# Patient Record
Sex: MALE | Race: WHITE | URBAN - METROPOLITAN AREA
[De-identification: names, ages, dates, MRNs, and addresses within clinical notes are randomized per-mention and may not be internally consistent; named-entity substitution may affect disease eponyms.]

---

## 2018-08-02 ENCOUNTER — INPATIENT (INPATIENT)
Facility: HOSPITAL | Age: 73
LOS: 12 days | Discharge: HOME | DRG: 233 | End: 2018-08-15
Attending: THORACIC SURGERY (CARDIOTHORACIC VASCULAR SURGERY) | Admitting: INTERNAL MEDICINE
Payer: MEDICARE

## 2018-08-02 VITALS
HEART RATE: 64 BPM | TEMPERATURE: 98 F | WEIGHT: 164.91 LBS | DIASTOLIC BLOOD PRESSURE: 98 MMHG | OXYGEN SATURATION: 99 % | SYSTOLIC BLOOD PRESSURE: 174 MMHG | HEIGHT: 68 IN

## 2018-08-02 DIAGNOSIS — I10 ESSENTIAL (PRIMARY) HYPERTENSION: ICD-10-CM

## 2018-08-02 DIAGNOSIS — I25.10 ATHEROSCLEROTIC HEART DISEASE OF NATIVE CORONARY ARTERY WITHOUT ANGINA PECTORIS: ICD-10-CM

## 2018-08-02 DIAGNOSIS — R94.39 ABNORMAL RESULT OF OTHER CARDIOVASCULAR FUNCTION STUDY: ICD-10-CM

## 2018-08-02 LAB
ANION GAP SERPL CALC-SCNC: 15 MMOL/L — SIGNIFICANT CHANGE UP (ref 5–17)
APTT BLD: 33.1 SEC — SIGNIFICANT CHANGE UP (ref 27.5–37.4)
BUN SERPL-MCNC: 16 MG/DL — SIGNIFICANT CHANGE UP (ref 7–23)
CALCIUM SERPL-MCNC: 9.2 MG/DL — SIGNIFICANT CHANGE UP (ref 8.4–10.5)
CHLORIDE SERPL-SCNC: 103 MMOL/L — SIGNIFICANT CHANGE UP (ref 96–108)
CO2 SERPL-SCNC: 22 MMOL/L — SIGNIFICANT CHANGE UP (ref 22–31)
CREAT SERPL-MCNC: 1.08 MG/DL — SIGNIFICANT CHANGE UP (ref 0.5–1.3)
GLUCOSE SERPL-MCNC: 110 MG/DL — HIGH (ref 70–99)
HCT VFR BLD CALC: 44.7 % — SIGNIFICANT CHANGE UP (ref 39–50)
HGB BLD-MCNC: 15.2 G/DL — SIGNIFICANT CHANGE UP (ref 13–17)
INR BLD: 1.05 RATIO — SIGNIFICANT CHANGE UP (ref 0.88–1.16)
MCHC RBC-ENTMCNC: 31.8 PG — SIGNIFICANT CHANGE UP (ref 27–34)
MCHC RBC-ENTMCNC: 33.9 GM/DL — SIGNIFICANT CHANGE UP (ref 32–36)
MCV RBC AUTO: 93.8 FL — SIGNIFICANT CHANGE UP (ref 80–100)
PLATELET # BLD AUTO: 229 K/UL — SIGNIFICANT CHANGE UP (ref 150–400)
POTASSIUM SERPL-MCNC: 3.9 MMOL/L — SIGNIFICANT CHANGE UP (ref 3.5–5.3)
POTASSIUM SERPL-SCNC: 3.9 MMOL/L — SIGNIFICANT CHANGE UP (ref 3.5–5.3)
PROTHROM AB SERPL-ACNC: 11.3 SEC — SIGNIFICANT CHANGE UP (ref 9.8–12.7)
RBC # BLD: 4.76 M/UL — SIGNIFICANT CHANGE UP (ref 4.2–5.8)
RBC # FLD: 11.7 % — SIGNIFICANT CHANGE UP (ref 10.3–14.5)
SODIUM SERPL-SCNC: 140 MMOL/L — SIGNIFICANT CHANGE UP (ref 135–145)
WBC # BLD: 7.2 K/UL — SIGNIFICANT CHANGE UP (ref 3.8–10.5)
WBC # FLD AUTO: 7.2 K/UL — SIGNIFICANT CHANGE UP (ref 3.8–10.5)

## 2018-08-02 RX ORDER — SIMVASTATIN 20 MG/1
20 TABLET, FILM COATED ORAL AT BEDTIME
Qty: 0 | Refills: 0 | Status: DISCONTINUED | OUTPATIENT
Start: 2018-08-02 | End: 2018-08-03

## 2018-08-02 RX ORDER — ASPIRIN/CALCIUM CARB/MAGNESIUM 324 MG
81 TABLET ORAL DAILY
Qty: 0 | Refills: 0 | Status: DISCONTINUED | OUTPATIENT
Start: 2018-08-02 | End: 2018-08-07

## 2018-08-02 RX ORDER — AMLODIPINE BESYLATE 2.5 MG/1
2.5 TABLET ORAL DAILY
Qty: 0 | Refills: 0 | Status: DISCONTINUED | OUTPATIENT
Start: 2018-08-02 | End: 2018-08-07

## 2018-08-02 RX ORDER — SIMVASTATIN 20 MG/1
1 TABLET, FILM COATED ORAL
Qty: 0 | Refills: 0 | COMMUNITY

## 2018-08-02 RX ORDER — METOPROLOL TARTRATE 50 MG
25 TABLET ORAL
Qty: 0 | Refills: 0 | Status: DISCONTINUED | OUTPATIENT
Start: 2018-08-02 | End: 2018-08-07

## 2018-08-02 RX ADMIN — SIMVASTATIN 20 MILLIGRAM(S): 20 TABLET, FILM COATED ORAL at 21:37

## 2018-08-02 NOTE — H&P CARDIOLOGY - HISTORY OF PRESENT ILLNESS
This is a 72 yo man with history of HTN with complaints of intermittent chest heaviness, increasing fatigue and shortness of breath. Went for stress test today which was abnormal pt had chest heaviness on treadmill with diffuse ST depression. Pt sent for urgent cardiac cath today with Dr. Haney. Pt was loaded with Brilinta and ASA in office today.     Dr. Carrillo PMD - [facility]   Dr. Haney - Cardiologist

## 2018-08-02 NOTE — CHART NOTE - NSCHARTNOTEFT_GEN_A_CORE
s/p cardiac cath via right radial access   2 vessel disease preliminary report prox LAD 95% and distal RCA 99%  CT consult with Dr. Tamayo service   low dose beta blocker initiated and ARB discontinued     discussed with Dr. Haney s/p cardiac cath via right radial access   2 vessel disease preliminary report prox LAD 95% and distal RCA 99%  CT consult with Dr. Tamayo service   low dose beta blocker initiated and ARB discontinued   Obtain baseline PT/PTT   start Heparin gtt 6 hours post cath no bolus     discussed with Dr. Haney and CT NP

## 2018-08-02 NOTE — H&P CARDIOLOGY - ATTENDING COMMENTS
Patient was sent for urgent cath for progressive angina and markedly abnormal stress echo with st depression and inferiro hypokinesia  cath revealed normal LV function with heavily calcified coronaries with severe diseae of proxix aand mid LAD with anurysmal dilitation of proximal LAD and severe disease of distal RCA 99% probable culprit    CABG recommended  patient stable no cp or sob stable VS negative physical exam  Recommend  CT surgery consult appreciated  IV heparin statin beta blocker

## 2018-08-02 NOTE — CONSULT NOTE ADULT - SUBJECTIVE AND OBJECTIVE BOX
History of Present Illness:    73y Male with history of HTN and former smoker with complaints of intermittent chest heaviness, increasing fatigue and shortness of breath who presented today to [facility] for an urgent cardiac cath following an abnormal stress test today, during which patient became symptomatic with c/o chest heaviness on treadmill with diffuse ST depression. Patient was loaded with Brilinta and ASA in the office and sent emergently for a cardiac cath.  Preliminary Cath finding revealed: multivessel CAD, Proximal LAD 95% and distal RCA 99%.  CTS consult called to evaluate patient for cardiac surgery.   Past Medical History  Hypertension, unspecified type    Past Surgical History  No significant past surgical history    MEDICATIONS  (STANDING):  amLODIPine Tablet 2.5 milliGRAM(s) Oral daily  simvastatin 20 milliGRAM(s) Oral at bedtime    MEDICATIONS  (PRN):    Antiplatelet therapy: Brilinta load                      Last dose/amt: 8/2 / 180 mg PO x 1     Allergies: No Known Allergies    SOCIAL HISTORY:  Former Smoker: [X ] Yes  [ ] No        PACK YEARS: 10 Pk / years                        WHEN QUIT?  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   Live with: Spouse  Assist device use: Denies   Relevant Family History  FAMILY HISTORY:  Family history of cerebrovascular accident (CVA)      Review of Systems  GENERAL:  Fevers[] chills[] sweats[] fatigue[] weight loss[] weight gain []                                        NEURO:  parathesias[] seizures []  syncope []  confusion []                                                                                  EYES: glasses[]  blurry vision[]  discharge[] pain[] glaucoma []                                                                            ENMT:  difficulty hearing []  vertigo[]  dysphagia[] epistaxis[] recent dental work []                                      CV:  chest pain[] palpitations[] MEDEIROS [] diaphoresis [] edema[]                                                                                             RESPIRATORY:  wheezing[] SOB[] cough [] sputum[] hemoptysis[]                                                                    GI:  nausea[]  vomiting []  diarrhea[] constipation [] melena []                                                                        : hematuria[ ]  dysuria[ ] urgency[] incontinence[]                                                                                              MUSCULOSKELETAL  arthritis[ ]  joint swelling [ ] muscle weakness [ ]                                                                  SKIN/BREAST:  rash[ ] itching [ ]  hair loss[ ] masses[ ]                                                                                                PSYCH:  dementia [ ] depression [ ] anxiety[ ]                                                                                                                  HEME/LYMPH:  bruises easily[ ] enlarged lymph nodes[ ] tender lymph nodes[ ]                                                 ENDOCRINE:  cold intolerance[ ] heat intolerance[ ] polydipsia[ ]                                                                              PHYSICAL EXAM  Vital Signs Last 24 Hrs  T(C): 36.8 (02 Aug 2018 17:45), Max: 36.8 (02 Aug 2018 17:45)  T(F): 98.3 (02 Aug 2018 17:45), Max: 98.3 (02 Aug 2018 17:45)  HR: 64 (02 Aug 2018 17:45) (64 - 64)  BP: 174/98 (02 Aug 2018 17:45) (174/98 - 174/98)  BP(mean): 123 (02 Aug 2018 17:45) (123 - 123)  RR: --  SpO2: 99% (02 Aug 2018 17:45) (99% - 99%)    General: Well nourished, well developed, no acute distress.                                                         Neuro: Normal exam oriented to person/place & time with no focal motor or sensory  deficits.                    Eyes: Normal exam of conjunctiva & lids, pupils equally reactive.   ENT: Normal exam of nasal/oral mucosa with absence of cyanosis.   Neck: Normal exam of jugular veins, trachea & thyroid.   Chest: Normal lung exam with good air movement absence of wheezes, rales, or rhonchi:                                                                          CV:  Auscultation: normal [ ] S3[ ] S4[ ] Irregular [ ] Rub[ ] Clicks[ ]  Murmurs none:[ ]systolic [ ]  diastolic [ ] holosystolic [ ]  Carotids: No Bruits[ ] Other____________ Abdominal Aorta: normal [ ] nonpalpable[ ]                                                                         GI: Normal exam of abdomen, liver & spleen with no noted masses or tenderness.  (+) BS X 4 Quadrants, Nontender / Non Distended.                                                                                             Extremities: Normal no evidence of cyanosis or deformity Edema: none[ ]trace[ ]1+[ ]2+[ ]3+[ ]4+[ ]  Lower Extremity Pulses: Right[ ] Left[ ]Varicosities[ ]  SKIN : Normal exam to inspection & palpation.                                                           LABS:                        15.2   7.2   )-----------( 229      ( 02 Aug 2018 18:01 )             44.7     08-02    140  |  103  |  16  ----------------------------<  110<H>  3.9   |  22  |  1.08    Ca    9.2      02 Aug 2018 18:01    Cardiac Cath:    TTE / FELIX: History of Present Illness:    73y Male with history of HTN and former smoker with complaints of intermittent chest heaviness, increasing fatigue and shortness of breath who presented today to [facility] for an urgent cardiac cath following an abnormal stress test today, during which patient became symptomatic with c/o chest heaviness on treadmill with diffuse ST depression. Patient was loaded with Brilinta and ASA in the office and sent emergently for a cardiac cath.  Preliminary Cath finding revealed: multivessel CAD, Proximal LAD 95% and distal RCA 99%.  CTS consult called to evaluate patient for cardiac surgery.   Past Medical History  Hypertension, unspecified type    Past Surgical History  No significant past surgical history    MEDICATIONS  (STANDING):  amLODIPine Tablet 2.5 milliGRAM(s) Oral daily  simvastatin 20 milliGRAM(s) Oral at bedtime    MEDICATIONS  (PRN):    Antiplatelet therapy: Brilinta load                      Last dose/amt: 8/2 / 180 mg PO x 1     Allergies: No Known Allergies    SOCIAL HISTORY:  Former Smoker: [X ] Yes  [ ] No        PACK YEARS: 10 Pk / years                        WHEN QUIT? 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   Live with: Spouse  Assist device use: Denies   Relevant Family History  FAMILY HISTORY:  Family history of cerebrovascular accident (CVA)      Review of Systems  GENERAL:  Fevers[] chills[] sweats[] fatigue[] weight loss[] weight gain []                                        NEURO:  parathesias[] seizures []  syncope []  confusion []                                                                                  EYES: glasses[]  blurry vision[]  discharge[] pain[] glaucoma []                                                                            ENMT:  difficulty hearing []  vertigo[]  dysphagia[] epistaxis[] recent dental work []                                      CV:  chest pain[X] palpitations[] MEDEIROS [] diaphoresis [] edema[]                                                                                             RESPIRATORY:  wheezing[] SOB[X] cough [] sputum[] hemoptysis[]                                                                    GI:  nausea[]  vomiting []  diarrhea[] constipation [] melena []                                                                        : hematuria[ ]  dysuria[ ] urgency[] incontinence[]                                                                                              MUSCULOSKELETAL  arthritis[ ]  joint swelling [ ] muscle weakness [ ]                                                                  SKIN/BREAST:  rash[ ] itching [ ]  hair loss[ ] masses[ ]                                                                                                PSYCH:  dementia [ ] depression [ ] anxiety[ ]                                                                                                                  HEME/LYMPH:  bruises easily[ ] enlarged lymph nodes[ ] tender lymph nodes[ ]                                                 ENDOCRINE:  cold intolerance[ ] heat intolerance[ ] polydipsia[ ]                                                                              PHYSICAL EXAM  Vital Signs Last 24 Hrs  T(C): 36.8 (02 Aug 2018 17:45), Max: 36.8 (02 Aug 2018 17:45)  T(F): 98.3 (02 Aug 2018 17:45), Max: 98.3 (02 Aug 2018 17:45)  HR: 64 (02 Aug 2018 17:45) (64 - 64)  BP: 174/98 (02 Aug 2018 17:45) (174/98 - 174/98)  BP(mean): 123 (02 Aug 2018 17:45) (123 - 123)  RR: --  SpO2: 99% (02 Aug 2018 17:45) (99% - 99%)    General: Well nourished, well developed, no acute distress.                                                         Neuro: Normal exam oriented to person/place & time with no focal motor or sensory  deficits.                    Eyes: Normal exam of conjunctiva & lids, pupils equally reactive.   ENT: Normal exam of nasal/oral mucosa with absence of cyanosis.   Neck: Normal exam of jugular veins, trachea & thyroid.   Chest: Normal lung exam with good air movement absence of wheezes, rales, or rhonchi:                                                                          CV:  Auscultation: normal [X ] S3[ ] S4[ ] Irregular [ ] Rub[ ] Clicks[ ]  Murmurs none:[X ]systolic [ ]  diastolic [ ] holosystolic [ ]  Carotids: No Bruits[-] Other____________ Abdominal Aorta: normal [X ] nonpalpable[X ]                                                                         GI: Normal exam of abdomen, liver & spleen with no noted masses or tenderness.  (+) BS X 4 Quadrants, Nontender / Non Distended.                                                                                             Extremities: Normal no evidence of cyanosis or deformity Edema: none[ ]trace[ ]1+[ ]2+[ ]3+[ ]4+[ ]  Lower Extremity Pulses: Right[+2 ] Left[+2 ]Varicosities[0 ]  SKIN : Normal exam to inspection & palpation.                                                           LABS:                        15.2   7.2   )-----------( 229      ( 02 Aug 2018 18:01 )             44.7     08-02    140  |  103  |  16  ----------------------------<  110<H>  3.9   |  22  |  1.08    Ca    9.2      02 Aug 2018 18:01    Cardiac Cath: EF 60%; < from: Cardiac Cath Lab - Adult (08.02.18 @ 18:05) The coronary circulation is right dominant. LM: Normal. heavily calcified Proximal LAD: There was a 95 % stenosis. The lesion was ulcerated, eccentric, and moderately calcified. followed by aneurysmal dilitation. Mid LAD: There was a tubular 80 % stenosis. post large diagonal D1: Angiography showed minor luminal irregularities with no flow limiting lesions.  Circumflex: Angiography showed minor luminal irregularities with no flow limiting lesions.  RCA: Angiography showed minor luminal irregularities with no flow limiting lesions. Distal RCA: There was a 99 % stenosis. The lesion was eccentric. large distal vessel

## 2018-08-02 NOTE — CONSULT NOTE ADULT - PROBLEM SELECTOR RECOMMENDATION 9
Admit to 2 [facility] Telemetry floor   Continue with ASA 81 mg PO daily   Start lopressor 25 mg PO BID   Continue with statin   Pre op Cardiac surgery work up in progress   TTE   Carotid Duplex study   Continue with Norvasc 5 mg PO daily for BP management   Start Heparin gtt for ACS 6 hours post sheath removal   PFT's  Type and Screen   MRSA /MSSA nasal swab   Start PUD prophylaxis   Activity as tolerated   Check P2Y12 in AM   Plan for cardiac surgery with Dr. Tamayo next week Monday   Plan of care discussed with attending

## 2018-08-03 DIAGNOSIS — Z01.818 ENCOUNTER FOR OTHER PREPROCEDURAL EXAMINATION: ICD-10-CM

## 2018-08-03 DIAGNOSIS — Z79.01 LONG TERM (CURRENT) USE OF ANTICOAGULANTS: ICD-10-CM

## 2018-08-03 DIAGNOSIS — Z29.9 ENCOUNTER FOR PROPHYLACTIC MEASURES, UNSPECIFIED: ICD-10-CM

## 2018-08-03 DIAGNOSIS — I25.10 ATHEROSCLEROTIC HEART DISEASE OF NATIVE CORONARY ARTERY WITHOUT ANGINA PECTORIS: ICD-10-CM

## 2018-08-03 PROBLEM — Z00.00 ENCOUNTER FOR PREVENTIVE HEALTH EXAMINATION: Status: ACTIVE | Noted: 2018-08-03

## 2018-08-03 LAB
ANION GAP SERPL CALC-SCNC: 10 MMOL/L — SIGNIFICANT CHANGE UP (ref 5–17)
APTT BLD: 52.3 SEC — HIGH (ref 27.5–37.4)
APTT BLD: 55.5 SEC — HIGH (ref 27.5–37.4)
APTT BLD: 72.6 SEC — HIGH (ref 27.5–37.4)
APTT BLD: 73.9 SEC — HIGH (ref 27.5–37.4)
BUN SERPL-MCNC: 15 MG/DL — SIGNIFICANT CHANGE UP (ref 7–23)
CALCIUM SERPL-MCNC: 8.8 MG/DL — SIGNIFICANT CHANGE UP (ref 8.4–10.5)
CHLORIDE SERPL-SCNC: 102 MMOL/L — SIGNIFICANT CHANGE UP (ref 96–108)
CO2 SERPL-SCNC: 26 MMOL/L — SIGNIFICANT CHANGE UP (ref 22–31)
CREAT SERPL-MCNC: 1.08 MG/DL — SIGNIFICANT CHANGE UP (ref 0.5–1.3)
GLUCOSE SERPL-MCNC: 114 MG/DL — HIGH (ref 70–99)
HCT VFR BLD CALC: 41.9 % — SIGNIFICANT CHANGE UP (ref 39–50)
HCT VFR BLD CALC: 43 % — SIGNIFICANT CHANGE UP (ref 39–50)
HGB BLD-MCNC: 14.4 G/DL — SIGNIFICANT CHANGE UP (ref 13–17)
HGB BLD-MCNC: 14.5 G/DL — SIGNIFICANT CHANGE UP (ref 13–17)
INR BLD: 1.01 RATIO — SIGNIFICANT CHANGE UP (ref 0.88–1.16)
MCHC RBC-ENTMCNC: 31.9 PG — SIGNIFICANT CHANGE UP (ref 27–34)
MCHC RBC-ENTMCNC: 33 PG — SIGNIFICANT CHANGE UP (ref 27–34)
MCHC RBC-ENTMCNC: 33.6 GM/DL — SIGNIFICANT CHANGE UP (ref 32–36)
MCHC RBC-ENTMCNC: 34.7 GM/DL — SIGNIFICANT CHANGE UP (ref 32–36)
MCV RBC AUTO: 95.2 FL — SIGNIFICANT CHANGE UP (ref 80–100)
MCV RBC AUTO: 95.3 FL — SIGNIFICANT CHANGE UP (ref 80–100)
MRSA PCR RESULT.: SIGNIFICANT CHANGE UP
PLATELET # BLD AUTO: 215 K/UL — SIGNIFICANT CHANGE UP (ref 150–400)
PLATELET # BLD AUTO: 219 K/UL — SIGNIFICANT CHANGE UP (ref 150–400)
POTASSIUM SERPL-MCNC: 4.2 MMOL/L — SIGNIFICANT CHANGE UP (ref 3.5–5.3)
POTASSIUM SERPL-SCNC: 4.2 MMOL/L — SIGNIFICANT CHANGE UP (ref 3.5–5.3)
PROTHROM AB SERPL-ACNC: 11 SEC — SIGNIFICANT CHANGE UP (ref 9.8–12.7)
RBC # BLD: 4.4 M/UL — SIGNIFICANT CHANGE UP (ref 4.2–5.8)
RBC # BLD: 4.52 M/UL — SIGNIFICANT CHANGE UP (ref 4.2–5.8)
RBC # FLD: 11.4 % — SIGNIFICANT CHANGE UP (ref 10.3–14.5)
RBC # FLD: 11.4 % — SIGNIFICANT CHANGE UP (ref 10.3–14.5)
S AUREUS DNA NOSE QL NAA+PROBE: SIGNIFICANT CHANGE UP
SODIUM SERPL-SCNC: 138 MMOL/L — SIGNIFICANT CHANGE UP (ref 135–145)
WBC # BLD: 6.8 K/UL — SIGNIFICANT CHANGE UP (ref 3.8–10.5)
WBC # BLD: 7.1 K/UL — SIGNIFICANT CHANGE UP (ref 3.8–10.5)
WBC # FLD AUTO: 6.8 K/UL — SIGNIFICANT CHANGE UP (ref 3.8–10.5)
WBC # FLD AUTO: 7.1 K/UL — SIGNIFICANT CHANGE UP (ref 3.8–10.5)

## 2018-08-03 PROCEDURE — 93880 EXTRACRANIAL BILAT STUDY: CPT | Mod: 26

## 2018-08-03 PROCEDURE — 99231 SBSQ HOSP IP/OBS SF/LOW 25: CPT

## 2018-08-03 PROCEDURE — 94010 BREATHING CAPACITY TEST: CPT | Mod: 26

## 2018-08-03 PROCEDURE — 93010 ELECTROCARDIOGRAM REPORT: CPT

## 2018-08-03 PROCEDURE — 93306 TTE W/DOPPLER COMPLETE: CPT | Mod: 26

## 2018-08-03 RX ORDER — HEPARIN SODIUM 5000 [USP'U]/ML
4400 INJECTION INTRAVENOUS; SUBCUTANEOUS EVERY 6 HOURS
Qty: 0 | Refills: 0 | Status: DISCONTINUED | OUTPATIENT
Start: 2018-08-03 | End: 2018-08-07

## 2018-08-03 RX ORDER — HEPARIN SODIUM 5000 [USP'U]/ML
INJECTION INTRAVENOUS; SUBCUTANEOUS
Qty: 25000 | Refills: 0 | Status: DISCONTINUED | OUTPATIENT
Start: 2018-08-03 | End: 2018-08-03

## 2018-08-03 RX ORDER — ATORVASTATIN CALCIUM 80 MG/1
40 TABLET, FILM COATED ORAL AT BEDTIME
Qty: 0 | Refills: 0 | Status: DISCONTINUED | OUTPATIENT
Start: 2018-08-03 | End: 2018-08-07

## 2018-08-03 RX ORDER — HEPARIN SODIUM 5000 [USP'U]/ML
1050 INJECTION INTRAVENOUS; SUBCUTANEOUS
Qty: 25000 | Refills: 0 | Status: DISCONTINUED | OUTPATIENT
Start: 2018-08-03 | End: 2018-08-07

## 2018-08-03 RX ORDER — PANTOPRAZOLE SODIUM 20 MG/1
40 TABLET, DELAYED RELEASE ORAL
Qty: 0 | Refills: 0 | Status: DISCONTINUED | OUTPATIENT
Start: 2018-08-03 | End: 2018-08-07

## 2018-08-03 RX ORDER — HEPARIN SODIUM 5000 [USP'U]/ML
4400 INJECTION INTRAVENOUS; SUBCUTANEOUS EVERY 6 HOURS
Qty: 0 | Refills: 0 | Status: DISCONTINUED | OUTPATIENT
Start: 2018-08-03 | End: 2018-08-03

## 2018-08-03 RX ORDER — SORBITOL SOLUTION 70 %
30 SOLUTION, ORAL MISCELLANEOUS ONCE
Qty: 0 | Refills: 0 | Status: COMPLETED | OUTPATIENT
Start: 2018-08-03 | End: 2018-08-03

## 2018-08-03 RX ADMIN — AMLODIPINE BESYLATE 2.5 MILLIGRAM(S): 2.5 TABLET ORAL at 05:33

## 2018-08-03 RX ADMIN — Medication 25 MILLIGRAM(S): at 05:33

## 2018-08-03 RX ADMIN — Medication 30 MILLILITER(S): at 12:08

## 2018-08-03 RX ADMIN — HEPARIN SODIUM 900 UNIT(S)/HR: 5000 INJECTION INTRAVENOUS; SUBCUTANEOUS at 01:56

## 2018-08-03 RX ADMIN — Medication 81 MILLIGRAM(S): at 05:33

## 2018-08-03 RX ADMIN — Medication 25 MILLIGRAM(S): at 18:09

## 2018-08-03 RX ADMIN — HEPARIN SODIUM 1050 UNIT(S)/HR: 5000 INJECTION INTRAVENOUS; SUBCUTANEOUS at 16:42

## 2018-08-03 RX ADMIN — HEPARIN SODIUM 1050 UNIT(S)/HR: 5000 INJECTION INTRAVENOUS; SUBCUTANEOUS at 23:56

## 2018-08-03 RX ADMIN — HEPARIN SODIUM 1050 UNIT(S)/HR: 5000 INJECTION INTRAVENOUS; SUBCUTANEOUS at 09:05

## 2018-08-03 RX ADMIN — ATORVASTATIN CALCIUM 40 MILLIGRAM(S): 80 TABLET, FILM COATED ORAL at 22:34

## 2018-08-03 RX ADMIN — PANTOPRAZOLE SODIUM 40 MILLIGRAM(S): 20 TABLET, DELAYED RELEASE ORAL at 05:33

## 2018-08-03 NOTE — PROGRESS NOTE ADULT - SUBJECTIVE AND OBJECTIVE BOX
Subjective   Hello 'I have no pain"  VITAL SIGNS    Telemetry:  NSR     Vital Signs Last 24 Hrs  T(C): 36.7 (18 @ 05:23), Max: 36.8 (18 @ 17:45)  T(F): 98.1 (18 @ 05:23), Max: 98.3 (18 @ 17:45)  HR: 75 (18 @ 05:23) (53 - 75)  BP: 137/75 (18 @ 05:23) (137/75 - 174/98)  RR: 18 (18 @ 05:23) (17 - 18)  SpO2: 97% (18 @ 05:23) (97% - 99%)             Daily Height in cm: 172.72 (02 Aug 2018 17:45)    Daily Weight in k.6 (03 Aug 2018 08:52)  MEDICATIONS  (STANDING):  amLODIPine   Tablet 2.5 milliGRAM(s) Oral daily  aspirin enteric coated 81 milliGRAM(s) Oral daily  atorvastatin 40 milliGRAM(s) Oral at bedtime  heparin  Infusion.  Unit(s)/Hr (9 mL/Hr) IV Continuous <Continuous>  metoprolol tartrate 25 milliGRAM(s) Oral two times a day  pantoprazole    Tablet 40 milliGRAM(s) Oral before breakfast    MEDICATIONS  (PRN):  heparin  Injectable 4400 Unit(s) IV Push every 6 hours PRN For aPTT less than 40    PHYSICAL EXAM  Neurology: alert and oriented x 3, nonfocal, no gross deficits  CV : S1 S2 RRR  Lungs: CTA  Abdomen: soft, nontender, nondistended, positive bowel sounds,   :      Vopids       Extremities:   b/l le warm well perfused + DP

## 2018-08-03 NOTE — PROGRESS NOTE ADULT - SUBJECTIVE AND OBJECTIVE BOX
Subjective: Patient seen and examined. No new events except as noted.   ttoday status post catheterization for unstable angina and markedly abnormal stress test  Severe distal right coronary disease and severe complex proximal and mid LAD disease calcified with aneurysm  Patient scheduled for coronary bypass surgery with Dr. Silvio Tamayo  Presently on IV heparin and by mouth beta blocker no chest pain chest pressure shortness of breath feels well  no recurrence of shortness of breath or chest pain  MEDICATIONS:  MEDICATIONS  (STANDING):  amLODIPine   Tablet 2.5 milliGRAM(s) Oral daily  aspirin enteric coated 81 milliGRAM(s) Oral daily  atorvastatin 40 milliGRAM(s) Oral at bedtime  heparin  Infusion.  Unit(s)/Hr (9 mL/Hr) IV Continuous <Continuous>  metoprolol tartrate 25 milliGRAM(s) Oral two times a day  pantoprazole    Tablet 40 milliGRAM(s) Oral before breakfast      PHYSICAL EXAM:  T(C): 36.4 (08-03-18 @ 13:48), Max: 36.8 (08-02-18 @ 17:45)  HR: 69 (08-03-18 @ 13:48) (53 - 75)  BP: 138/77 (08-03-18 @ 13:48) (137/75 - 174/98)  RR: 18 (08-03-18 @ 13:48) (17 - 18)  SpO2: 96% (08-03-18 @ 13:48) (96% - 99%)  Wt(kg): --  I&O's Summary    03 Aug 2018 07:01  -  03 Aug 2018 14:14  --------------------------------------------------------  IN: 360 mL / OUT: 0 mL / NET: 360 mL      Height (cm): 172.72 (08-02 @ 17:45)  Weight (kg): 74.8 (08-02 @ 17:45)  BMI (kg/m2): 25.1 (08-02 @ 17:45)  BSA (m2): 1.88 (08-02 @ 17:45)    Appearance: Normalno acute distress	  HEENT:   Normal oral mucosa, PERRL, EOMI	  Cardiovascular: Normal S1 S2, No JVD, No murmurs ,  Respiratory: Lungs clear to auscultation, normal effort 	  Gastrointestinal:  Soft, Non-tender, + BS	  Skin: No rashes, No ecchymoses, No cyanosis, warm to touch  Musculoskeletal: Normal range of motion, normal strength  Psychiatry:  Mood & affect appropriate  Ext: No edema  Peripheral pulses palpable 2+ bilaterally      LABS:    CARDIAC MARKERS:                                14.4   7.1   )-----------( 219      ( 03 Aug 2018 08:29 )             43.0     08-03    138  |  102  |  15  ----------------------------<  114<H>  4.2   |  26  |  1.08    Ca    8.8      03 Aug 2018 08:29      proBNP:   Lipid Profile:   HgA1c:   TSH:           TELEMETRY: 	    ECG:  	  RADIOLOGY:   DIAGNOSTIC TESTING:  [ ] Echocardiogram:  [ ]  Catheterization:  [ ] Stress Test:    OTHER:

## 2018-08-03 NOTE — PROGRESS NOTE ADULT - SUBJECTIVE AND OBJECTIVE BOX
Subjective:   Events overnight: R radial site stable, heparin gtt initated  CC: "I am feeling ok    VITAL SIGNS  T(F): 97.8  HR: 60  BP: 159/86  RR: 17  SpO2: 99%    Weight (kg): 74.8 (08-02-18 @ 17:45)  LAB                        15.2   7.2   )-----------( 229      ( 02 Aug 2018 18:01 )             44.7   140  |  103  |  16  ----------------------------<  110<H>  3.9   |  22  |  1.08  PT/INR - ( 02 Aug 2018 22:24 )   PT: 11.3 sec;   INR: 1.05 ratio    PTT - ( 02 Aug 2018 22:24 )  PTT:33.1 sec      DIAGNOSTICS: Cardiac Cath Lab - Adult (08.02.18 @ 18:05)   EF estimated was 60 %.  VALVES: MITRAL VALVE: The mitral valve exhibited no regurgitation.  CORONARY VESSELS: The coronary circulation is right dominant.  LM:   --  LM: Normal. heavily calcified  LAD:   --  Proximal LAD: There was a 95 % stenosis. The lesionwas  ulcerated, eccentric, and moderately calcified. followed by anurysmal  dilitation  --  Mid LAD: There was a tubular 80 % stenosis. post large diagonal  --  D1: Angiography showed minor luminal irregularities with no flow  limiting lesions.  CX: --  Circumflex: Angiography showed minor luminal irregularities with  no flow limiting lesions.  RCA:   --  RCA: Angiography showed minor luminal irregularities with no  flow limiting lesions.  --  Distal RCA: There was a 99 % stenosis. The lesion was eccentric. large  distal vessel  COMPLICATIONS: There were no complications.  DIAGNOSTIC IMPRESSIONS: Patient has severe distal RCA disease with some  left to right collaterals and severe proximal and mid LAD disease after  large diagonal with anurysmla dilitation or proximal LAD  DIAGNOSTIC RECOMMENDATIONS: Revasularization probably best served by CABG.    MEDICATIONS  amLODIPine   Tablet 2.5 milliGRAM(s) Oral daily  aspirin enteric coated 81 milliGRAM(s) Oral daily  heparin  Infusion.  Unit(s)/Hr IV Continuous <Continuous>  metoprolol tartrate 25 milliGRAM(s) Oral two times a day  pantoprazole    Tablet 40 milliGRAM(s) Oral before breakfast  simvastatin 20 milliGRAM(s) Oral at bedtime      PHYSICAL EXAM  GEN: No apparent distress, examined in bed, awoke from sleep  PSYCH: Appropriate, communicative, A+Ox3  NEURO: Non-focal,   CV: S1 S2 RRR without rub or murmur  PULMONARY:  clear  ABDOMEN:  Soft, non-tender, non-distended, bowel sounds active x 4  LBM:8/1  : voiding   VASCULAR: +pp +radial,  SKIN: Warm, dry, intact   R radial dressing clean,dry, intact  MUSCULOSKELETAL:  Moves all extremities equally

## 2018-08-03 NOTE — PROGRESS NOTE ADULT - ASSESSMENT
This is 73M with pmh of HTN with complaints of intermittent chest heaviness, increasing fatigue and shortness of breath. Presented to cardiologists office and underwent stress test which was found to be abnormal (+ chest heaviness with diffuse ST depression) and sent for urgent cardiac cath.  Pt was loaded with Brilinta and ASA in office today.    Cath revealed multivessel CAD and was admitted for further management and CABG.    Placed on tele  cardiac surgery consult called schedule dfor CABG Monday 8/6/18   Radial band discontinued at 1930   8/3@0200: Heparin gtt initiated patient seen and examine no chest pain  c/w cardiac surgery evaluation

## 2018-08-03 NOTE — PROGRESS NOTE ADULT - ASSESSMENT
1. Unstable angina no recurrence of pain  2. Normal LV function  3. Severe double vessel disease as describedwith heavy calcification    recommend  Received with coronary bypass surgery  Continue present regimen of medications including IV heparin

## 2018-08-03 NOTE — PROGRESS NOTE ADULT - PROBLEM SELECTOR PLAN 4
Pre op Cardiac surgery work up in progress   TTE   Carotid Duplex study   PFT's  Type and Screen   MRSA /MSSA nasal swab   Check P2Y12 in AM

## 2018-08-03 NOTE — PROGRESS NOTE ADULT - PROBLEM SELECTOR PLAN 1
Admit to 2DSU/2C telemetry  Plan for cardiac surgery with Dr. Tamayo next week Monday  Continue with ASA 81 mg PO daily   Start lopressor 25 mg PO BID   Continue with statin  Activity as tolerated

## 2018-08-04 LAB
ALBUMIN SERPL ELPH-MCNC: 4 G/DL — SIGNIFICANT CHANGE UP (ref 3.3–5)
ALP SERPL-CCNC: 65 U/L — SIGNIFICANT CHANGE UP (ref 40–120)
ALT FLD-CCNC: 19 U/L — SIGNIFICANT CHANGE UP (ref 10–45)
ANION GAP SERPL CALC-SCNC: 12 MMOL/L — SIGNIFICANT CHANGE UP (ref 5–17)
APPEARANCE UR: CLEAR — SIGNIFICANT CHANGE UP
APTT BLD: 103.4 SEC — HIGH (ref 27.5–37.4)
APTT BLD: 49.9 SEC — HIGH (ref 27.5–37.4)
AST SERPL-CCNC: 29 U/L — SIGNIFICANT CHANGE UP (ref 10–40)
BILIRUB DIRECT SERPL-MCNC: <0.1 MG/DL — SIGNIFICANT CHANGE UP (ref 0–0.2)
BILIRUB INDIRECT FLD-MCNC: >0.4 MG/DL — SIGNIFICANT CHANGE UP (ref 0.2–1)
BILIRUB SERPL-MCNC: 0.5 MG/DL — SIGNIFICANT CHANGE UP (ref 0.2–1.2)
BILIRUB UR-MCNC: NEGATIVE — SIGNIFICANT CHANGE UP
BUN SERPL-MCNC: 17 MG/DL — SIGNIFICANT CHANGE UP (ref 7–23)
CALCIUM SERPL-MCNC: 8.9 MG/DL — SIGNIFICANT CHANGE UP (ref 8.4–10.5)
CHLORIDE SERPL-SCNC: 104 MMOL/L — SIGNIFICANT CHANGE UP (ref 96–108)
CO2 SERPL-SCNC: 24 MMOL/L — SIGNIFICANT CHANGE UP (ref 22–31)
COLOR SPEC: YELLOW — SIGNIFICANT CHANGE UP
CREAT SERPL-MCNC: 1.05 MG/DL — SIGNIFICANT CHANGE UP (ref 0.5–1.3)
DIFF PNL FLD: NEGATIVE — SIGNIFICANT CHANGE UP
GLUCOSE SERPL-MCNC: 108 MG/DL — HIGH (ref 70–99)
GLUCOSE UR QL: NEGATIVE — SIGNIFICANT CHANGE UP
HBA1C BLD-MCNC: 6 % — HIGH (ref 4–5.6)
HCT VFR BLD CALC: 43.9 % — SIGNIFICANT CHANGE UP (ref 39–50)
HGB BLD-MCNC: 14.8 G/DL — SIGNIFICANT CHANGE UP (ref 13–17)
INR BLD: 0.99 RATIO — SIGNIFICANT CHANGE UP (ref 0.88–1.16)
KETONES UR-MCNC: NEGATIVE — SIGNIFICANT CHANGE UP
LEUKOCYTE ESTERASE UR-ACNC: NEGATIVE — SIGNIFICANT CHANGE UP
MCHC RBC-ENTMCNC: 32.3 PG — SIGNIFICANT CHANGE UP (ref 27–34)
MCHC RBC-ENTMCNC: 33.7 GM/DL — SIGNIFICANT CHANGE UP (ref 32–36)
MCV RBC AUTO: 95.9 FL — SIGNIFICANT CHANGE UP (ref 80–100)
NITRITE UR-MCNC: NEGATIVE — SIGNIFICANT CHANGE UP
PA ADP PRP-ACNC: 248 PRU — SIGNIFICANT CHANGE UP (ref 194–417)
PH UR: 5.5 — SIGNIFICANT CHANGE UP (ref 5–8)
PLATELET # BLD AUTO: 235 K/UL — SIGNIFICANT CHANGE UP (ref 150–400)
POTASSIUM SERPL-MCNC: 3.9 MMOL/L — SIGNIFICANT CHANGE UP (ref 3.5–5.3)
POTASSIUM SERPL-SCNC: 3.9 MMOL/L — SIGNIFICANT CHANGE UP (ref 3.5–5.3)
PROT SERPL-MCNC: 6.8 G/DL — SIGNIFICANT CHANGE UP (ref 6–8.3)
PROT UR-MCNC: NEGATIVE — SIGNIFICANT CHANGE UP
PROTHROM AB SERPL-ACNC: 11.2 SEC — SIGNIFICANT CHANGE UP (ref 10–13.1)
RBC # BLD: 4.58 M/UL — SIGNIFICANT CHANGE UP (ref 4.2–5.8)
RBC # FLD: 13.3 % — SIGNIFICANT CHANGE UP (ref 10.3–14.5)
SODIUM SERPL-SCNC: 140 MMOL/L — SIGNIFICANT CHANGE UP (ref 135–145)
SP GR SPEC: 1.02 — SIGNIFICANT CHANGE UP (ref 1.01–1.02)
UROBILINOGEN FLD QL: NEGATIVE — SIGNIFICANT CHANGE UP
WBC # BLD: 7.46 K/UL — SIGNIFICANT CHANGE UP (ref 3.8–10.5)
WBC # FLD AUTO: 7.46 K/UL — SIGNIFICANT CHANGE UP (ref 3.8–10.5)

## 2018-08-04 RX ADMIN — HEPARIN SODIUM 1200 UNIT(S)/HR: 5000 INJECTION INTRAVENOUS; SUBCUTANEOUS at 10:59

## 2018-08-04 RX ADMIN — Medication 25 MILLIGRAM(S): at 18:10

## 2018-08-04 RX ADMIN — Medication 81 MILLIGRAM(S): at 11:00

## 2018-08-04 RX ADMIN — PANTOPRAZOLE SODIUM 40 MILLIGRAM(S): 20 TABLET, DELAYED RELEASE ORAL at 06:54

## 2018-08-04 RX ADMIN — Medication 25 MILLIGRAM(S): at 06:54

## 2018-08-04 RX ADMIN — ATORVASTATIN CALCIUM 40 MILLIGRAM(S): 80 TABLET, FILM COATED ORAL at 21:09

## 2018-08-04 RX ADMIN — HEPARIN SODIUM 1000 UNIT(S)/HR: 5000 INJECTION INTRAVENOUS; SUBCUTANEOUS at 19:21

## 2018-08-04 RX ADMIN — AMLODIPINE BESYLATE 2.5 MILLIGRAM(S): 2.5 TABLET ORAL at 06:54

## 2018-08-04 RX ADMIN — HEPARIN SODIUM 0 UNIT(S)/HR: 5000 INJECTION INTRAVENOUS; SUBCUTANEOUS at 18:10

## 2018-08-04 NOTE — PROGRESS NOTE ADULT - SUBJECTIVE AND OBJECTIVE BOX
Subjective  Hello"I feel fine no chest pain"    VITAL SIGNS    Telemetry:  nsr [id number]    Vital Signs Last 24 Hrs  T(C): 36.5 (08-04-18 @ 04:46), Max: 36.7 (08-03-18 @ 16:00)  T(F): 97.7 (08-04-18 @ 04:46), Max: 98.1 (08-03-18 @ 16:00)  HR: 58 (08-04-18 @ 04:46) (58 - 69)  BP: 116/67 (08-04-18 @ 04:46) (116/67 - 168/85)  RR: 18 (08-04-18 @ 04:46) (18 - 18)  SpO2: 98% (08-04-18 @ 04:46) (96% - 98%)           8-03 @ 07:01  -  08-04 @ 07:00  --------------------------------------------------------  IN: 1151 mL / OUT: 0 mL / NET: 1151 mL    08-04 @ 07:01  -  08-04 @ 12:21  --------------------------------------------------------  IN: 240 mL / OUT: 0 mL / NET: 240 mL  MEDICATIONS  (STANDING):  amLODIPine   Tablet 2.5 milliGRAM(s) Oral daily  aspirin enteric coated 81 milliGRAM(s) Oral daily  atorvastatin 40 milliGRAM(s) Oral at bedtime  heparin  Infusion. 1050 Unit(s)/Hr (10.5 mL/Hr) IV Continuous <Continuous>  metoprolol tartrate 25 milliGRAM(s) Oral two times a day  pantoprazole    Tablet 40 milliGRAM(s) Oral before breakfast    MEDICATIONS  (PRN):  heparin  Injectable 4400 Unit(s) IV Push every 6 hours PRN For aPTT less than 40  PHYSICAL EXAM  Neurology: alert and oriented x 3, nonfocal, no gross deficits  CV :S1 S2 RRR  Lungs: B/L CTA  Abdomen: soft, nontender, nondistended, positive bowel sounds, last bowel movement 8/3  :     voids        Extremities:    b/l LE warm + DP no calf tenderness               Discussed with Cardiothoracic Team at AM rounds.

## 2018-08-04 NOTE — PROGRESS NOTE ADULT - ASSESSMENT
1. Unstable angina no recurrence of pain  2. Normal LV function  3. Severe double vessel disease as describedwith heavy calcification    await CABG  IV heparin  asa  care per cts

## 2018-08-04 NOTE — PROGRESS NOTE ADULT - SUBJECTIVE AND OBJECTIVE BOX
CARDIOLOGY FOLLOW UP NOTE - DR. TIJERINA (for Koss)    Subjective:    no cp, sob    PHYSICAL EXAM:  T(C): 36.5 (08-04-18 @ 04:46), Max: 36.7 (08-03-18 @ 16:00)  HR: 58 (08-04-18 @ 04:46) (58 - 69)  BP: 116/67 (08-04-18 @ 04:46) (116/67 - 168/85)  RR: 18 (08-04-18 @ 04:46) (18 - 18)  SpO2: 98% (08-04-18 @ 04:46) (96% - 98%)  Wt(kg): --  I&O's Summary    03 Aug 2018 07:01  -  04 Aug 2018 07:00  --------------------------------------------------------  IN: 1151 mL / OUT: 0 mL / NET: 1151 mL        Appearance: Normal	  Cardiovascular: Normal S1 S2,RRR, No JVD, No murmurs  Respiratory: Lungs clear to auscultation	  Gastrointestinal:  Soft, Non-tender, + BS	  Extremities: Normal range of motion, No clubbing, cyanosis or edema  Vascular: Peripheral pulses palpable 2+ bilaterally    MEDICATIONS  (STANDING):  amLODIPine   Tablet 2.5 milliGRAM(s) Oral daily  aspirin enteric coated 81 milliGRAM(s) Oral daily  atorvastatin 40 milliGRAM(s) Oral at bedtime  heparin  Infusion. 1050 Unit(s)/Hr (10.5 mL/Hr) IV Continuous <Continuous>  metoprolol tartrate 25 milliGRAM(s) Oral two times a day  pantoprazole    Tablet 40 milliGRAM(s) Oral before breakfast      TELEMETRY: 	    ECG:  	  RADIOLOGY:   DIAGNOSTIC TESTING:  [ ] Echocardiogram:  [ ] Catheterization:  [ ] Stress Test:    OTHER: 	    LABS:	 	    CARDIAC MARKERS:                                14.8   7.46  )-----------( 235      ( 04 Aug 2018 08:25 )             43.9     08-04    140  |  104  |  17  ----------------------------<  108<H>  3.9   |  24  |  1.05    Ca    8.9      04 Aug 2018 06:49      proBNP:   PT/INR - ( 04 Aug 2018 08:26 )   PT: 11.2 sec;   INR: 0.99 ratio         PTT - ( 04 Aug 2018 08:26 )  PTT:49.9 sec  Lipid Profile:   HgA1c:

## 2018-08-04 NOTE — PROGRESS NOTE ADULT - PROBLEM SELECTOR PLAN 1
Admit to 2DSU/2C telemetry  Plan for cardiac surgery with Dr. Tamayo next week Tuesday  Continue with ASA 81 mg PO daily   Start lopressor 25 mg PO BID   Continue with statin  Activity as tolerated

## 2018-08-04 NOTE — PROGRESS NOTE ADULT - ASSESSMENT
This is 73M with pmh of HTN with complaints of intermittent chest heaviness, increasing fatigue and shortness of breath. Presented to cardiologists office and underwent stress test which was found to be abnormal (+ chest heaviness with diffuse ST depression) and sent for urgent cardiac cath.  Pt was loaded with Brilinta and ASA in office today.    Cath revealed multivessel CAD and was admitted for further management and CABG.    Placed on tele  cardiac surgery consult called schedule dfor CABG Monday 8/6/18   Radial band discontinued at 1930   8/3@0200: Heparin gtt initiated patient seen and examine no chest pain  c/w cardiac surgery evaluation   8/4 UA P2Y12 TSH Aic pending Scheduled for OR Tuesday

## 2018-08-05 LAB
ANION GAP SERPL CALC-SCNC: 11 MMOL/L — SIGNIFICANT CHANGE UP (ref 5–17)
APTT BLD: 61.4 SEC — HIGH (ref 27.5–37.4)
APTT BLD: 63.7 SEC — HIGH (ref 27.5–37.4)
APTT BLD: 72.8 SEC — HIGH (ref 27.5–37.4)
APTT BLD: 82.6 SEC — HIGH (ref 27.5–37.4)
BUN SERPL-MCNC: 17 MG/DL — SIGNIFICANT CHANGE UP (ref 7–23)
CALCIUM SERPL-MCNC: 9.5 MG/DL — SIGNIFICANT CHANGE UP (ref 8.4–10.5)
CHLORIDE SERPL-SCNC: 104 MMOL/L — SIGNIFICANT CHANGE UP (ref 96–108)
CO2 SERPL-SCNC: 24 MMOL/L — SIGNIFICANT CHANGE UP (ref 22–31)
CREAT SERPL-MCNC: 1.1 MG/DL — SIGNIFICANT CHANGE UP (ref 0.5–1.3)
GLUCOSE SERPL-MCNC: 105 MG/DL — HIGH (ref 70–99)
HCT VFR BLD CALC: 44 % — SIGNIFICANT CHANGE UP (ref 39–50)
HGB BLD-MCNC: 15 G/DL — SIGNIFICANT CHANGE UP (ref 13–17)
MCHC RBC-ENTMCNC: 32.2 PG — SIGNIFICANT CHANGE UP (ref 27–34)
MCHC RBC-ENTMCNC: 34 GM/DL — SIGNIFICANT CHANGE UP (ref 32–36)
MCV RBC AUTO: 94.7 FL — SIGNIFICANT CHANGE UP (ref 80–100)
PLATELET # BLD AUTO: 216 K/UL — SIGNIFICANT CHANGE UP (ref 150–400)
POTASSIUM SERPL-MCNC: 4.3 MMOL/L — SIGNIFICANT CHANGE UP (ref 3.5–5.3)
POTASSIUM SERPL-SCNC: 4.3 MMOL/L — SIGNIFICANT CHANGE UP (ref 3.5–5.3)
RBC # BLD: 4.65 M/UL — SIGNIFICANT CHANGE UP (ref 4.2–5.8)
RBC # FLD: 11.4 % — SIGNIFICANT CHANGE UP (ref 10.3–14.5)
SODIUM SERPL-SCNC: 139 MMOL/L — SIGNIFICANT CHANGE UP (ref 135–145)
TSH SERPL-MCNC: 2.36 UIU/ML — SIGNIFICANT CHANGE UP (ref 0.27–4.2)
WBC # BLD: 7.5 K/UL — SIGNIFICANT CHANGE UP (ref 3.8–10.5)
WBC # FLD AUTO: 7.5 K/UL — SIGNIFICANT CHANGE UP (ref 3.8–10.5)

## 2018-08-05 RX ADMIN — ATORVASTATIN CALCIUM 40 MILLIGRAM(S): 80 TABLET, FILM COATED ORAL at 21:30

## 2018-08-05 RX ADMIN — PANTOPRAZOLE SODIUM 40 MILLIGRAM(S): 20 TABLET, DELAYED RELEASE ORAL at 05:21

## 2018-08-05 RX ADMIN — Medication 25 MILLIGRAM(S): at 11:21

## 2018-08-05 RX ADMIN — HEPARIN SODIUM 900 UNIT(S)/HR: 5000 INJECTION INTRAVENOUS; SUBCUTANEOUS at 23:20

## 2018-08-05 RX ADMIN — HEPARIN SODIUM 900 UNIT(S)/HR: 5000 INJECTION INTRAVENOUS; SUBCUTANEOUS at 14:47

## 2018-08-05 RX ADMIN — HEPARIN SODIUM 1000 UNIT(S)/HR: 5000 INJECTION INTRAVENOUS; SUBCUTANEOUS at 01:29

## 2018-08-05 RX ADMIN — AMLODIPINE BESYLATE 2.5 MILLIGRAM(S): 2.5 TABLET ORAL at 05:21

## 2018-08-05 RX ADMIN — Medication 25 MILLIGRAM(S): at 21:30

## 2018-08-05 RX ADMIN — Medication 81 MILLIGRAM(S): at 11:21

## 2018-08-05 RX ADMIN — HEPARIN SODIUM 900 UNIT(S)/HR: 5000 INJECTION INTRAVENOUS; SUBCUTANEOUS at 07:51

## 2018-08-05 NOTE — PROGRESS NOTE ADULT - SUBJECTIVE AND OBJECTIVE BOX
CARDIOLOGY FOLLOW UP NOTE - DR. TIJERINA (for koss)    Subjective:    no cp, sob        PHYSICAL EXAM:  T(C): 36.7 (08-05-18 @ 05:13), Max: 36.7 (08-05-18 @ 05:13)  HR: 60 (08-05-18 @ 05:13) (54 - 62)  BP: 103/59 (08-05-18 @ 05:13) (103/59 - 125/65)  RR: 17 (08-05-18 @ 05:13) (17 - 18)  SpO2: 97% (08-05-18 @ 05:13) (94% - 98%)  Wt(kg): --  I&O's Summary    04 Aug 2018 07:01  -  05 Aug 2018 07:00  --------------------------------------------------------  IN: 1270 mL / OUT: 0 mL / NET: 1270 mL        Appearance: Normal	  Cardiovascular: Normal S1 S2,RRR, No JVD, No murmurs  Respiratory: Lungs clear to auscultation	  Gastrointestinal:  Soft, Non-tender, + BS	  Extremities: Normal range of motion, No clubbing, cyanosis or edema  Vascular: Peripheral pulses palpable 2+ bilaterally    MEDICATIONS  (STANDING):  amLODIPine   Tablet 2.5 milliGRAM(s) Oral daily  aspirin enteric coated 81 milliGRAM(s) Oral daily  atorvastatin 40 milliGRAM(s) Oral at bedtime  heparin  Infusion. 1050 Unit(s)/Hr (10.5 mL/Hr) IV Continuous <Continuous>  metoprolol tartrate 25 milliGRAM(s) Oral two times a day  pantoprazole    Tablet 40 milliGRAM(s) Oral before breakfast      TELEMETRY: 	    ECG:  	  RADIOLOGY:   DIAGNOSTIC TESTING:  [ ] Echocardiogram:  [ ] Catheterization:  [ ] Stress Test:    OTHER: 	    LABS:	 	    CARDIAC MARKERS:                                15.0   7.5   )-----------( 216      ( 05 Aug 2018 06:57 )             44.0     08-05    139  |  104  |  17  ----------------------------<  105<H>  4.3   |  24  |  1.10    Ca    9.5      05 Aug 2018 06:57    TPro  6.8  /  Alb  4.0  /  TBili  0.5  /  DBili  <0.1  /  AST  29  /  ALT  19  /  AlkPhos  65  08-04    proBNP:   PT/INR - ( 04 Aug 2018 08:26 )   PT: 11.2 sec;   INR: 0.99 ratio         PTT - ( 05 Aug 2018 06:57 )  PTT:82.6 sec  Lipid Profile:   HgA1c: Hemoglobin A1C, Whole Blood: 6.0 % (08-04 @ 20:18)

## 2018-08-05 NOTE — PROGRESS NOTE ADULT - ASSESSMENT
73M -pmh of HTN with complaints of intermittent chest heaviness, increasing fatigue and shortness of breath. Presented to cardiologists office and underwent stress test which was found to be abnormal (+chest heaviness with diffuse ST depression) and sent for urgent cardiac cath.  Pt was loaded with Brilinta and ASA in office today.    Cath revealed multivessel CAD and was admitted for further management and CABG.    Placed on tele  cardiac surgery consult called schedule dfor CABG Monday 8/6/18  Radial band discontinued at 1930  8/3@0200: Heparin gtt initiated patient seen and examine no chest pain  c/w cardiac surgery evaluation   8/4 UA P2Y12 TSH Aic pending Scheduled for OR Tuesday 8/5 - VSS- OR TUE -   d/c planning home after cabg

## 2018-08-05 NOTE — PROGRESS NOTE ADULT - SUBJECTIVE AND OBJECTIVE BOX
VITAL SIGNS-Tele:    Vital Signs Last 24 Hrs  T(C): 36.7 (08-05-18 @ 05:13), Max: 36.7 (08-05-18 @ 05:13)  HR: 60 (08-05-18 @ 05:13) (54 - 62)  BP: 103/59 (08-05-18 @ 05:13) (103/59 - 125/65)  SpO2: 97% (08-05-18 @ 05:13) (94% - 98%)         08-04 @ 07:01  -  08-05 @ 07:00  --------------------------------------------------------  IN: 1270 mL / OUT: 0 mL / NET: 1270 mL       PHYSICAL EXAM:  Neurology: alert and oriented x 3, nonfocal, no gross deficits  CV : S1S2  Lungs: CTA  Abdomen: soft, nontender, nondistended, positive bowel sounds, last bowel movement +BM         Extremities:     no edema, no calf tenderness    amLODIPine   Tablet 2.5 milliGRAM(s) Oral daily  aspirin enteric coated 81 milliGRAM(s) Oral daily  atorvastatin 40 milliGRAM(s) Oral at bedtime  heparin  Infusion. 1050 Unit(s)/Hr IV Continuous <Continuous>  heparin  Injectable 4400 Unit(s) IV Push every 6 hours PRN  metoprolol tartrate 25 milliGRAM(s) Oral two times a day  pantoprazole    Tablet 40 milliGRAM(s) Oral before breakfast    Physical Therapy Rec:   Home  [x  ]   Home w/ PT  [  ]  Rehab  [  ]  Discussed with Cardiothoracic Team at AM rounds. VITAL SIGNS-Tele:  SB/SR 50-60  Vital Signs Last 24 Hrs  T(C): 36.7 (08-05-18 @ 05:13), Max: 36.7 (08-05-18 @ 05:13)  HR: 60 (08-05-18 @ 05:13) (54 - 62)  BP: 103/59 (08-05-18 @ 05:13) (103/59 - 125/65)  SpO2: 97% (08-05-18 @ 05:13) (94% - 98%)         08-04 @ 07:01  -  08-05 @ 07:00  --------------------------------------------------------  IN: 1270 mL / OUT: 0 mL / NET: 1270 mL       PHYSICAL EXAM:  Neurology: alert and oriented x 3, nonfocal, no gross deficits  CV : S1S2  Lungs: CTA  Abdomen: soft, nontender, nondistended, positive bowel sounds, last bowel movement +BM         Extremities:     no edema, no calf tenderness    amLODIPine   Tablet 2.5 milliGRAM(s) Oral daily  aspirin enteric coated 81 milliGRAM(s) Oral daily  atorvastatin 40 milliGRAM(s) Oral at bedtime  heparin  Infusion. 1050 Unit(s)/Hr IV Continuous <Continuous>  heparin  Injectable 4400 Unit(s) IV Push every 6 hours PRN  metoprolol tartrate 25 milliGRAM(s) Oral two times a day  pantoprazole    Tablet 40 milliGRAM(s) Oral before breakfast    Physical Therapy Rec:   Home  [x  ]   Home w/ PT  [  ]  Rehab  [  ]  Discussed with Cardiothoracic Team at AM rounds.

## 2018-08-06 LAB
ANION GAP SERPL CALC-SCNC: 11 MMOL/L — SIGNIFICANT CHANGE UP (ref 5–17)
BLD GP AB SCN SERPL QL: NEGATIVE — SIGNIFICANT CHANGE UP
BUN SERPL-MCNC: 18 MG/DL — SIGNIFICANT CHANGE UP (ref 7–23)
CALCIUM SERPL-MCNC: 9.1 MG/DL — SIGNIFICANT CHANGE UP (ref 8.4–10.5)
CHLORIDE SERPL-SCNC: 103 MMOL/L — SIGNIFICANT CHANGE UP (ref 96–108)
CO2 SERPL-SCNC: 25 MMOL/L — SIGNIFICANT CHANGE UP (ref 22–31)
CREAT SERPL-MCNC: 1.12 MG/DL — SIGNIFICANT CHANGE UP (ref 0.5–1.3)
GLUCOSE SERPL-MCNC: 95 MG/DL — SIGNIFICANT CHANGE UP (ref 70–99)
HCT VFR BLD CALC: 41.3 % — SIGNIFICANT CHANGE UP (ref 39–50)
HGB BLD-MCNC: 14 G/DL — SIGNIFICANT CHANGE UP (ref 13–17)
INR BLD: 1.06 RATIO — SIGNIFICANT CHANGE UP (ref 0.88–1.16)
MCHC RBC-ENTMCNC: 32.3 PG — SIGNIFICANT CHANGE UP (ref 27–34)
MCHC RBC-ENTMCNC: 33.9 GM/DL — SIGNIFICANT CHANGE UP (ref 32–36)
MCV RBC AUTO: 95.4 FL — SIGNIFICANT CHANGE UP (ref 80–100)
PLATELET # BLD AUTO: 209 K/UL — SIGNIFICANT CHANGE UP (ref 150–400)
POTASSIUM SERPL-MCNC: 4.1 MMOL/L — SIGNIFICANT CHANGE UP (ref 3.5–5.3)
POTASSIUM SERPL-SCNC: 4.1 MMOL/L — SIGNIFICANT CHANGE UP (ref 3.5–5.3)
PROTHROM AB SERPL-ACNC: 11.5 SEC — SIGNIFICANT CHANGE UP (ref 9.8–12.7)
RBC # BLD: 4.34 M/UL — SIGNIFICANT CHANGE UP (ref 4.2–5.8)
RBC # FLD: 11.5 % — SIGNIFICANT CHANGE UP (ref 10.3–14.5)
RH IG SCN BLD-IMP: NEGATIVE — SIGNIFICANT CHANGE UP
SODIUM SERPL-SCNC: 139 MMOL/L — SIGNIFICANT CHANGE UP (ref 135–145)
WBC # BLD: 7 K/UL — SIGNIFICANT CHANGE UP (ref 3.8–10.5)
WBC # FLD AUTO: 7 K/UL — SIGNIFICANT CHANGE UP (ref 3.8–10.5)

## 2018-08-06 PROCEDURE — 71045 X-RAY EXAM CHEST 1 VIEW: CPT | Mod: 26

## 2018-08-06 RX ORDER — CHLORHEXIDINE GLUCONATE 213 G/1000ML
5 SOLUTION TOPICAL ONCE
Qty: 0 | Refills: 0 | Status: COMPLETED | OUTPATIENT
Start: 2018-08-06 | End: 2018-08-07

## 2018-08-06 RX ORDER — CEFUROXIME AXETIL 250 MG
1500 TABLET ORAL ONCE
Qty: 0 | Refills: 0 | Status: DISCONTINUED | OUTPATIENT
Start: 2018-08-07 | End: 2018-08-07

## 2018-08-06 RX ORDER — CHLORHEXIDINE GLUCONATE 213 G/1000ML
1 SOLUTION TOPICAL ONCE
Qty: 0 | Refills: 0 | Status: COMPLETED | OUTPATIENT
Start: 2018-08-06 | End: 2018-08-06

## 2018-08-06 RX ADMIN — PANTOPRAZOLE SODIUM 40 MILLIGRAM(S): 20 TABLET, DELAYED RELEASE ORAL at 05:49

## 2018-08-06 RX ADMIN — ATORVASTATIN CALCIUM 40 MILLIGRAM(S): 80 TABLET, FILM COATED ORAL at 21:39

## 2018-08-06 RX ADMIN — Medication 81 MILLIGRAM(S): at 11:20

## 2018-08-06 RX ADMIN — AMLODIPINE BESYLATE 2.5 MILLIGRAM(S): 2.5 TABLET ORAL at 05:49

## 2018-08-06 RX ADMIN — Medication 25 MILLIGRAM(S): at 05:49

## 2018-08-06 RX ADMIN — Medication 25 MILLIGRAM(S): at 17:21

## 2018-08-06 RX ADMIN — CHLORHEXIDINE GLUCONATE 1 APPLICATION(S): 213 SOLUTION TOPICAL at 23:49

## 2018-08-06 NOTE — PROGRESS NOTE ADULT - SUBJECTIVE AND OBJECTIVE BOX
Subjective: Patient seen and examined. No new events except as noted.   Patient awaitng CABG  no cp or sob feels well ambulating  MEDICATIONS:  MEDICATIONS  (STANDING):  amLODIPine   Tablet 2.5 milliGRAM(s) Oral daily  aspirin enteric coated 81 milliGRAM(s) Oral daily  atorvastatin 40 milliGRAM(s) Oral at bedtime  chlorhexidine 0.12% Liquid 5 milliLiter(s) Swish and Spit once  chlorhexidine 4% Liquid 1 Application(s) Topical once  heparin  Infusion. 1050 Unit(s)/Hr (10.5 mL/Hr) IV Continuous <Continuous>  metoprolol tartrate 25 milliGRAM(s) Oral two times a day  pantoprazole    Tablet 40 milliGRAM(s) Oral before breakfast      PHYSICAL EXAM:  T(C): 36.7 (08-06-18 @ 05:00), Max: 36.7 (08-06-18 @ 05:00)  HR: 61 (08-06-18 @ 05:00) (59 - 62)  BP: 109/56 (08-06-18 @ 05:00) (109/56 - 133/74)  RR: 18 (08-06-18 @ 05:00) (18 - 18)  SpO2: 98% (08-06-18 @ 05:00) (98% - 98%)  Wt(kg): --  I&O's Summary    05 Aug 2018 07:01  -  06 Aug 2018 07:00  --------------------------------------------------------  IN: 1296 mL / OUT: 500 mL / NET: 796 mL    06 Aug 2018 07:01  -  06 Aug 2018 11:51  --------------------------------------------------------  IN: 200 mL / OUT: 0 mL / NET: 200 mL          Appearance: Normal NAD looks well	  HEENT:   Normal oral mucosa, PERRL, EOMI	  Cardiovascular: Normal S1 S2, No JVD, No murmurs ,  Respiratory: Lungs clear to auscultation, normal effort 	  Gastrointestinal:  Soft, Non-tender, + BS	  Skin: No rashes, No ecchymoses, No cyanosis, warm to touch  Musculoskeletal: Normal range of motion, normal strength  Psychiatry:  Mood & affect appropriate  Ext: No edema  Peripheral pulses palpable 2+ bilaterally      LABS:    CARDIAC MARKERS:                                14.0   7.0   )-----------( 209      ( 06 Aug 2018 07:17 )             41.3     08-06    139  |  103  |  18  ----------------------------<  95  4.1   |  25  |  1.12    Ca    9.1      06 Aug 2018 07:16      proBNP:   Lipid Profile:   HgA1c:   TSH:           TELEMETRY: 	    ECG:  	  RADIOLOGY:   DIAGNOSTIC TESTING:  [ ] Echocardiogram:  [ ]  Catheterization:  [ ] Stress Test:    OTHER:

## 2018-08-06 NOTE — PROGRESS NOTE ADULT - ASSESSMENT
1. Unstable angina no recurrence of pain  2. Normal LV function  3. Severe double vessel disease as described with heavy calcification adn anurysm of LAD    await CABG  IV heparin  asa statin  care per cts

## 2018-08-06 NOTE — PROGRESS NOTE ADULT - SUBJECTIVE AND OBJECTIVE BOX
Cardiac Surgery Pre-op Note:    CC: Patient is a 73y old  Male who presents with a chief complaint of                                                                                                              Surgeon: Dr Tamayo    Procedure:     CABG    Allergies    No Known Allergies    Intolerances        HPI:   74 yo man with history of HTN with complaints of intermittent chest heaviness, increasing fatigue and shortness of breath. Went for stress test today which was abnormal pt had chest heaviness on treadmill with diffuse ST depression. Pt sent for urgent cardiac cath today with Dr. Haney. Pt was loaded with Brilinta and ASA in office today.     Dr. Carrillo PMD - [facility]   Dr. Haney - Cardiologist (02 Aug 2018 17:45)      PAST MEDICAL & SURGICAL HISTORY:  Hypertension, unspecified type  No significant past surgical history      MEDICATIONS  (STANDING):  amLODIPine   Tablet 2.5 milliGRAM(s) Oral daily  aspirin enteric coated 81 milliGRAM(s) Oral daily  atorvastatin 40 milliGRAM(s) Oral at bedtime  chlorhexidine 0.12% Liquid 5 milliLiter(s) Swish and Spit once  chlorhexidine 4% Liquid 1 Application(s) Topical once  heparin  Infusion. 1050 Unit(s)/Hr (10.5 mL/Hr) IV Continuous <Continuous>  metoprolol tartrate 25 milliGRAM(s) Oral two times a day  pantoprazole    Tablet 40 milliGRAM(s) Oral before breakfast    MEDICATIONS  (PRN):  heparin  Injectable 4400 Unit(s) IV Push every 6 hours PRN For aPTT less than 40        Labs:                        14.0   7.0   )-----------( 209      ( 06 Aug 2018 07:17 )             41.3     -    139  |  103  |  18  ----------------------------<  95  4.1   |  25  |  1.12    Ca    9.1      06 Aug 2018 07:16      PT/INR - ( 06 Aug 2018 07:17 )   PT: 11.5 sec;   INR: 1.06 ratio         PTT - ( 05 Aug 2018 21:48 )  PTT:61.4 sec    Blood Type: ABO Interpretation: O ( @ 09:49)    HGB A1C: Hemoglobin A1C, Whole Blood: 6.0 % ( @ 20:18)      Thyroid Panel:  @ 21:14/2.36  --/--/--    MRSA: MRSA PCR Result.: NotDetec ( @ 13:42)   / MSSA:   Urinalysis Basic - ( 04 Aug 2018 16:38 )    Color: Yellow / Appearance: Clear / S.018 / pH: x  Gluc: x / Ketone: Negative  / Bili: Negative / Urobili: Negative   Blood: x / Protein: Negative / Nitrite: Negative   Leuk Esterase: Negative / RBC: x / WBC x   Sq Epi: x / Non Sq Epi: x / Bacteria: x        CXR:     EKG:    Carotid Duplex:      PFT's:    Echocardiogram:    Cardiac catheterization:    Vein Mapping:    Gen: WN/WD NAD  Neuro: AAOx3, nonfocal  Pulm: CTA B/L  CV: RRR, S1S2  Abd: Soft, NT, ND +BS  Ext: No edema, + peripheral pulses      Pt has AICD/PPM    No            Pre-op Beta Blocker ordered within 24 hrs of surgery (CABG ONLY)?  Yes       Type & Cross   Yes    NPO after Midnight Yes    Pre-op ABX ordered, to be taped on chart:   Yes      Hibiclens/Peridex ordered Yes    Intraop on Hold: PRBCs, CXR, FELIX   Consent obtained  [ ] Yes  [ ] No Cardiac Surgery Pre-op Note:    CC: Patient is a 73y old  Male who presents with a chief complaint of                                                                                                              Surgeon: Dr Tamayo    Procedure:     CABG    Allergies    No Known Allergies    Intolerances        HPI:   74 yo man with history of HTN with complaints of intermittent chest heaviness, increasing fatigue and shortness of breath. Went for stress test today which was abnormal pt had chest heaviness on treadmill with diffuse ST depression. Pt sent for urgent cardiac cath today with Dr. Haney. Pt was loaded with Brilinta and ASA in office today.     Dr. Carrillo PMD - [facility]   Dr. Haney - Cardiologist (02 Aug 2018 17:45)      PAST MEDICAL & SURGICAL HISTORY:  Hypertension, unspecified type  No significant past surgical history      MEDICATIONS  (STANDING):  amLODIPine   Tablet 2.5 milliGRAM(s) Oral daily  aspirin enteric coated 81 milliGRAM(s) Oral daily  atorvastatin 40 milliGRAM(s) Oral at bedtime  chlorhexidine 0.12% Liquid 5 milliLiter(s) Swish and Spit once  chlorhexidine 4% Liquid 1 Application(s) Topical once  heparin  Infusion. 1050 Unit(s)/Hr (10.5 mL/Hr) IV Continuous <Continuous>  metoprolol tartrate 25 milliGRAM(s) Oral two times a day  pantoprazole    Tablet 40 milliGRAM(s) Oral before breakfast    MEDICATIONS  (PRN):  heparin  Injectable 4400 Unit(s) IV Push every 6 hours PRN For aPTT less than 40        Labs:                        14.0   7.0   )-----------( 209      ( 06 Aug 2018 07:17 )             41.3     -    139  |  103  |  18  ----------------------------<  95  4.1   |  25  |  1.12    Ca    9.1      06 Aug 2018 07:16      PT/INR - ( 06 Aug 2018 07:17 )   PT: 11.5 sec;   INR: 1.06 ratio         PTT - ( 05 Aug 2018 21:48 )  PTT:61.4 sec    Blood Type: ABO Interpretation: O ( @ 09:49)    HGB A1C: Hemoglobin A1C, Whole Blood: 6.0 % ( @ 20:18)      Thyroid Panel:  @ 21:14/2.36  --/--/--    MRSA: MRSA PCR Result.: NotDetec ( @ 13:42)   / MSSA:   Urinalysis Basic - ( 04 Aug 2018 16:38 )    Color: Yellow / Appearance: Clear / S.018 / pH: x  Gluc: x / Ketone: Negative  / Bili: Negative / Urobili: Negative   Blood: x / Protein: Negative / Nitrite: Negative   Leuk Esterase: Negative / RBC: x / WBC x   Sq Epi: x / Non Sq Epi: x / Bacteria: x        CXR:     EKG:  sinus bradycardia    Carotid Duplex:  no significant stenosis      Echocardiogram:Conclusions:  Technically limited/difficult:  1. Normal mitral valve. Minimal mitral regurgitation.  2. Aortic valve not well visualized; probably normal.  3. Aortic Root: 3.4 cm.  4. Endocardium not well visualized; grossly normal left  ventricular systolic function.  5. Normal right ventricular size and function.  6. Inadequate tricuspid regurgitation Doppler envelope  precludes estimation of RVSP.  *** No previous Echo exam.    Cardiac catheterization:    Vein Mapping:    Gen: WN/WD NAD  Neuro: AAOx3, nonfocal  Pulm: CTA B/L  CV: RRR, S1S2  Abd: Soft, NT, ND +BS  Ext: No edema, + peripheral pulses      Pt has AICD/PPM    No            Pre-op Beta Blocker ordered within 24 hrs of surgery (CABG ONLY)?  Yes       Type & Cross   Yes    NPO after Midnight Yes    Pre-op ABX ordered, to be taped on chart:   Yes      Hibiclens/Peridex ordered Yes    Intraop on Hold: PRBCs, CXR, FELIX   Consent obtained  [ ] Yes  [ ] No Cardiac Surgery Pre-op Note:    CC: Patient is a 73y old  Male who presents with a chief complaint of                                                                                                              Surgeon: Dr Tamayo    Procedure:     CABG    Allergies    No Known Allergies    Intolerances        HPI:   72 yo man with history of HTN with complaints of intermittent chest heaviness, increasing fatigue and shortness of breath. Went for stress test today which was abnormal pt had chest heaviness on treadmill with diffuse ST depression. Pt sent for urgent cardiac cath today with Dr. Haney. Pt was loaded with Brilinta and ASA in office today.     Dr. Carrillo PMD - [facility]   Dr. Haney - Cardiologist (02 Aug 2018 17:45)      PAST MEDICAL & SURGICAL HISTORY:  Hypertension, unspecified type  No significant past surgical history      MEDICATIONS  (STANDING):  amLODIPine   Tablet 2.5 milliGRAM(s) Oral daily  aspirin enteric coated 81 milliGRAM(s) Oral daily  atorvastatin 40 milliGRAM(s) Oral at bedtime  chlorhexidine 0.12% Liquid 5 milliLiter(s) Swish and Spit once  chlorhexidine 4% Liquid 1 Application(s) Topical once  heparin  Infusion. 1050 Unit(s)/Hr (10.5 mL/Hr) IV Continuous <Continuous>  metoprolol tartrate 25 milliGRAM(s) Oral two times a day  pantoprazole    Tablet 40 milliGRAM(s) Oral before breakfast    MEDICATIONS  (PRN):  heparin  Injectable 4400 Unit(s) IV Push every 6 hours PRN For aPTT less than 40        Labs:                        14.0   7.0   )-----------( 209      ( 06 Aug 2018 07:17 )             41.3     -    139  |  103  |  18  ----------------------------<  95  4.1   |  25  |  1.12    Ca    9.1      06 Aug 2018 07:16      PT/INR - ( 06 Aug 2018 07:17 )   PT: 11.5 sec;   INR: 1.06 ratio         PTT - ( 05 Aug 2018 21:48 )  PTT:61.4 sec    Blood Type: ABO Interpretation: O ( @ 09:49)    HGB A1C: Hemoglobin A1C, Whole Blood: 6.0 % ( @ 20:18)      Thyroid Panel:  @ 21:14/2.36  --/--/--    MRSA: MRSA PCR Result.: NotDetec ( @ 13:42)   / MSSA:   Urinalysis Basic - ( 04 Aug 2018 16:38 )    Color: Yellow / Appearance: Clear / S.018 / pH: x  Gluc: x / Ketone: Negative  / Bili: Negative / Urobili: Negative   Blood: x / Protein: Negative / Nitrite: Negative   Leuk Esterase: Negative / RBC: x / WBC x   Sq Epi: x / Non Sq Epi: x / Bacteria: x      cath:   LM:   --  LM: Normal. heavily calcified  LAD:   --  Proximal LAD: There was a 95 % stenosis. The lesion was  ulcerated, eccentric, and moderately calcified. followed by anurysmal  dilitation  --  Mid LAD: There was a tubular 80 % stenosis. post large diagonal  --  D1: Angiography showed minor luminal irregularities with no flow  limiting lesions.  CX:   --  Circumflex: Angiography showed minor luminal irregularities with  no flow limiting lesions.  RCA:   --  RCA: Angiography showed minor luminal irregularities with no  flow limiting lesions.  --  Distal RCA: There was a 99 % stenosis. The lesion was eccentric. large  distal vessel   Chest xray:    EKG:  sinus bradycardia    Carotid Duplex:  no significant stenosis      Echocardiogram:Conclusions:  Technically limited/difficult:  1. Normal mitral valve. Minimal mitral regurgitation.  2. Aortic valve not well visualized; probably normal.  3. Aortic Root: 3.4 cm.  4. Endocardium not well visualized; grossly normal left  ventricular systolic function.  5. Normal right ventricular size and function.  6. Inadequate tricuspid regurgitation Doppler envelope  precludes estimation of RVSP.  *** No previous Echo exam.    Gen: WN/WD NAD  Neuro: AAOx3, nonfocal  Pulm: CTA B/L  CV: RRR, S1S2  Abd: Soft, NT, ND +BS  Ext: No edema, + peripheral pulses      Pt has AICD/PPM    No            Pre-op Beta Blocker ordered within 24 hrs of surgery (CABG ONLY)?  Yes       Type & Cross   Yes    NPO after Midnight Yes    Pre-op ABX ordered, to be taped on chart:   Yes      Hibiclens/Peridex ordered Yes    Intraop on Hold: PRBCs, CXR, FELIX   Consent obtained  [ ] Yes  [ ] No Cardiac Surgery Pre-op Note:    CC: Patient is a 73y old  Male who presents with a chief complaint of                                                                                                              Surgeon: Dr Tamayo    Procedure:     CABG    Allergies    No Known Allergies    Intolerances        HPI:   74 yo man with history of HTN with complaints of intermittent chest heaviness, increasing fatigue and shortness of breath. Went for stress test today which was abnormal pt had chest heaviness on treadmill with diffuse ST depression. Pt sent for urgent cardiac cath today with Dr. Haney. Pt was loaded with Brilinta and ASA in office today.     Dr. Carrillo PMD - [facility]   Dr. Haney - Cardiologist (02 Aug 2018 17:45)      PAST MEDICAL & SURGICAL HISTORY:  Hypertension, unspecified type  No significant past surgical history      MEDICATIONS  (STANDING):  amLODIPine   Tablet 2.5 milliGRAM(s) Oral daily  aspirin enteric coated 81 milliGRAM(s) Oral daily  atorvastatin 40 milliGRAM(s) Oral at bedtime  chlorhexidine 0.12% Liquid 5 milliLiter(s) Swish and Spit once  chlorhexidine 4% Liquid 1 Application(s) Topical once  heparin  Infusion. 1050 Unit(s)/Hr (10.5 mL/Hr) IV Continuous <Continuous>  metoprolol tartrate 25 milliGRAM(s) Oral two times a day  pantoprazole    Tablet 40 milliGRAM(s) Oral before breakfast    MEDICATIONS  (PRN):  heparin  Injectable 4400 Unit(s) IV Push every 6 hours PRN For aPTT less than 40        Labs:                        14.0   7.0   )-----------( 209      ( 06 Aug 2018 07:17 )             41.3     -    139  |  103  |  18  ----------------------------<  95  4.1   |  25  |  1.12    Ca    9.1      06 Aug 2018 07:16      PT/INR - ( 06 Aug 2018 07:17 )   PT: 11.5 sec;   INR: 1.06 ratio         PTT - ( 05 Aug 2018 21:48 )  PTT:61.4 sec    Blood Type: ABO Interpretation: O ( @ 09:49)    HGB A1C: Hemoglobin A1C, Whole Blood: 6.0 % ( @ 20:18)      Thyroid Panel:  @ 21:14/2.36  --/--/--    MRSA: MRSA PCR Result.: NotDetec ( @ 13:42)   / MSSA:   Urinalysis Basic - ( 04 Aug 2018 16:38 )    Color: Yellow / Appearance: Clear / S.018 / pH: x  Gluc: x / Ketone: Negative  / Bili: Negative / Urobili: Negative   Blood: x / Protein: Negative / Nitrite: Negative   Leuk Esterase: Negative / RBC: x / WBC x   Sq Epi: x / Non Sq Epi: x / Bacteria: x      cath:   LM:   --  LM: Normal. heavily calcified  LAD:   --  Proximal LAD: There was a 95 % stenosis. The lesion was  ulcerated, eccentric, and moderately calcified. followed by anurysmal  dilitation  --  Mid LAD: There was a tubular 80 % stenosis. post large diagonal  --  D1: Angiography showed minor luminal irregularities with no flow  limiting lesions.  CX:   --  Circumflex: Angiography showed minor luminal irregularities with  no flow limiting lesions.  RCA:   --  RCA: Angiography showed minor luminal irregularities with no  flow limiting lesions.  --  Distal RCA: There was a 99 % stenosis. The lesion was eccentric. large  distal vessel   Chest xray:  NAD  EKG:  sinus bradycardia    Carotid Duplex:  no significant stenosis      Echocardiogram:Conclusions:  Technically limited/difficult:  1. Normal mitral valve. Minimal mitral regurgitation.  2. Aortic valve not well visualized; probably normal.  3. Aortic Root: 3.4 cm.  4. Endocardium not well visualized; grossly normal left  ventricular systolic function.  5. Normal right ventricular size and function.  6. Inadequate tricuspid regurgitation Doppler envelope  precludes estimation of RVSP.  *** No previous Echo exam.    Gen: WN/WD NAD  Neuro: AAOx3, nonfocal  Pulm: CTA B/L  CV: RRR, S1S2  Abd: Soft, NT, ND +BS  Ext: No edema, + peripheral pulses      Pt has AICD/PPM    No            Pre-op Beta Blocker ordered within 24 hrs of surgery (CABG ONLY)?  Yes       Type & Cross   Yes    NPO after Midnight Yes    Pre-op ABX ordered, to be taped on chart:   Yes      Hibiclens/Peridex ordered Yes    Intraop on Hold: PRBCs, CXR, FELIX   Consent obtained  [ ] Yes  [ ] No

## 2018-08-06 NOTE — PROGRESS NOTE ADULT - ASSESSMENT
73M -pmh of HTN with complaints of intermittent chest heaviness, increasing fatigue and shortness of breath. Presented to cardiologists office and underwent stress test which was found to be abnormal (+chest heaviness with diffuse ST depression) and sent for urgent cardiac cath.  Pt was loaded with Brilinta and ASA in office today.    Cath revealed multivessel CAD and was admitted for further management and CABG.    Placed on tele  cardiac surgery consult called schedule dfor CABG Monday 8/6/18  Radial band discontinued at 1930  8/3@0200: Heparin gtt initiated patient seen and examine no chest pain  c/w cardiac surgery evaluation   8/4 UA P2Y12 TSH Aic pending Scheduled for OR Tuesday 8/5 - VSS- OR TUE - 8/56  NPO after midnight  Hep GTT  to OR

## 2018-08-06 NOTE — PROGRESS NOTE ADULT - PROBLEM SELECTOR PLAN 1
Plan for cardiac surgery with Dr. Tamayo  in am  Continue with ASA 81 mg PO daily    lopressor 25 mg PO BID   Continue with statin

## 2018-08-07 ENCOUNTER — APPOINTMENT (OUTPATIENT)
Dept: CARDIOTHORACIC SURGERY | Facility: HOSPITAL | Age: 73
End: 2018-08-07

## 2018-08-07 LAB
ALBUMIN SERPL ELPH-MCNC: 3.9 G/DL — SIGNIFICANT CHANGE UP (ref 3.3–5)
ALP SERPL-CCNC: 48 U/L — SIGNIFICANT CHANGE UP (ref 40–120)
ALT FLD-CCNC: 55 U/L — HIGH (ref 10–45)
ANION GAP SERPL CALC-SCNC: 11 MMOL/L — SIGNIFICANT CHANGE UP (ref 5–17)
ANION GAP SERPL CALC-SCNC: 14 MMOL/L — SIGNIFICANT CHANGE UP (ref 5–17)
APTT BLD: 30.2 SEC — SIGNIFICANT CHANGE UP (ref 27.5–37.4)
APTT BLD: 62.5 SEC — HIGH (ref 27.5–37.4)
AST SERPL-CCNC: 66 U/L — HIGH (ref 10–40)
BASOPHILS # BLD AUTO: 0 K/UL — SIGNIFICANT CHANGE UP (ref 0–0.2)
BASOPHILS # BLD AUTO: 0.1 K/UL — SIGNIFICANT CHANGE UP (ref 0–0.2)
BASOPHILS NFR BLD AUTO: 0.5 % — SIGNIFICANT CHANGE UP (ref 0–2)
BASOPHILS NFR BLD AUTO: 0.7 % — SIGNIFICANT CHANGE UP (ref 0–2)
BILIRUB SERPL-MCNC: 1.2 MG/DL — SIGNIFICANT CHANGE UP (ref 0.2–1.2)
BUN SERPL-MCNC: 14 MG/DL — SIGNIFICANT CHANGE UP (ref 7–23)
BUN SERPL-MCNC: 16 MG/DL — SIGNIFICANT CHANGE UP (ref 7–23)
CALCIUM SERPL-MCNC: 8 MG/DL — LOW (ref 8.4–10.5)
CALCIUM SERPL-MCNC: 9.3 MG/DL — SIGNIFICANT CHANGE UP (ref 8.4–10.5)
CHLORIDE SERPL-SCNC: 103 MMOL/L — SIGNIFICANT CHANGE UP (ref 96–108)
CHLORIDE SERPL-SCNC: 104 MMOL/L — SIGNIFICANT CHANGE UP (ref 96–108)
CK MB CFR SERPL CALC: 18.9 NG/ML — HIGH (ref 0–6.7)
CK SERPL-CCNC: 278 U/L — HIGH (ref 30–200)
CO2 SERPL-SCNC: 22 MMOL/L — SIGNIFICANT CHANGE UP (ref 22–31)
CO2 SERPL-SCNC: 26 MMOL/L — SIGNIFICANT CHANGE UP (ref 22–31)
CREAT SERPL-MCNC: 0.9 MG/DL — SIGNIFICANT CHANGE UP (ref 0.5–1.3)
CREAT SERPL-MCNC: 1.2 MG/DL — SIGNIFICANT CHANGE UP (ref 0.5–1.3)
EOSINOPHIL # BLD AUTO: 0.1 K/UL — SIGNIFICANT CHANGE UP (ref 0–0.5)
EOSINOPHIL # BLD AUTO: 0.4 K/UL — SIGNIFICANT CHANGE UP (ref 0–0.5)
EOSINOPHIL NFR BLD AUTO: 1.2 % — SIGNIFICANT CHANGE UP (ref 0–6)
EOSINOPHIL NFR BLD AUTO: 5.6 % — SIGNIFICANT CHANGE UP (ref 0–6)
FIBRINOGEN PPP-MCNC: 298 MG/DL — LOW (ref 310–510)
GAS PNL BLDA: SIGNIFICANT CHANGE UP
GLUCOSE BLDC GLUCOMTR-MCNC: 185 MG/DL — HIGH (ref 70–99)
GLUCOSE SERPL-MCNC: 142 MG/DL — HIGH (ref 70–99)
GLUCOSE SERPL-MCNC: 96 MG/DL — SIGNIFICANT CHANGE UP (ref 70–99)
HCT VFR BLD CALC: 32.6 % — LOW (ref 39–50)
HCT VFR BLD CALC: 40.2 % — SIGNIFICANT CHANGE UP (ref 39–50)
HGB BLD-MCNC: 11.2 G/DL — LOW (ref 13–17)
HGB BLD-MCNC: 14.5 G/DL — SIGNIFICANT CHANGE UP (ref 13–17)
INR BLD: 1.04 RATIO — SIGNIFICANT CHANGE UP (ref 0.88–1.16)
INR BLD: 1.21 RATIO — HIGH (ref 0.88–1.16)
LYMPHOCYTES # BLD AUTO: 1 K/UL — SIGNIFICANT CHANGE UP (ref 1–3.3)
LYMPHOCYTES # BLD AUTO: 2.6 K/UL — SIGNIFICANT CHANGE UP (ref 1–3.3)
LYMPHOCYTES # BLD AUTO: 33.1 % — SIGNIFICANT CHANGE UP (ref 13–44)
LYMPHOCYTES # BLD AUTO: 9.7 % — LOW (ref 13–44)
MCHC RBC-ENTMCNC: 32.3 PG — SIGNIFICANT CHANGE UP (ref 27–34)
MCHC RBC-ENTMCNC: 34.2 GM/DL — SIGNIFICANT CHANGE UP (ref 32–36)
MCHC RBC-ENTMCNC: 34.6 PG — HIGH (ref 27–34)
MCHC RBC-ENTMCNC: 36 GM/DL — SIGNIFICANT CHANGE UP (ref 32–36)
MCV RBC AUTO: 94.3 FL — SIGNIFICANT CHANGE UP (ref 80–100)
MCV RBC AUTO: 96.1 FL — SIGNIFICANT CHANGE UP (ref 80–100)
MONOCYTES # BLD AUTO: 1 K/UL — HIGH (ref 0–0.9)
MONOCYTES # BLD AUTO: 1.1 K/UL — HIGH (ref 0–0.9)
MONOCYTES NFR BLD AUTO: 10.4 % — SIGNIFICANT CHANGE UP (ref 2–14)
MONOCYTES NFR BLD AUTO: 13.1 % — SIGNIFICANT CHANGE UP (ref 2–14)
NEUTROPHILS # BLD AUTO: 3.7 K/UL — SIGNIFICANT CHANGE UP (ref 1.8–7.4)
NEUTROPHILS # BLD AUTO: 8.5 K/UL — HIGH (ref 1.8–7.4)
NEUTROPHILS NFR BLD AUTO: 47.7 % — SIGNIFICANT CHANGE UP (ref 43–77)
NEUTROPHILS NFR BLD AUTO: 78.1 % — HIGH (ref 43–77)
PLATELET # BLD AUTO: 136 K/UL — LOW (ref 150–400)
PLATELET # BLD AUTO: 218 K/UL — SIGNIFICANT CHANGE UP (ref 150–400)
POTASSIUM SERPL-MCNC: 3.9 MMOL/L — SIGNIFICANT CHANGE UP (ref 3.5–5.3)
POTASSIUM SERPL-MCNC: 3.9 MMOL/L — SIGNIFICANT CHANGE UP (ref 3.5–5.3)
POTASSIUM SERPL-SCNC: 3.9 MMOL/L — SIGNIFICANT CHANGE UP (ref 3.5–5.3)
POTASSIUM SERPL-SCNC: 3.9 MMOL/L — SIGNIFICANT CHANGE UP (ref 3.5–5.3)
PROT SERPL-MCNC: 5.7 G/DL — LOW (ref 6–8.3)
PROTHROM AB SERPL-ACNC: 11.3 SEC — SIGNIFICANT CHANGE UP (ref 9.8–12.7)
PROTHROM AB SERPL-ACNC: 13.1 SEC — HIGH (ref 9.8–12.7)
RBC # BLD: 3.46 M/UL — LOW (ref 4.2–5.8)
RBC # BLD: 4.18 M/UL — LOW (ref 4.2–5.8)
RBC # FLD: 11.4 % — SIGNIFICANT CHANGE UP (ref 10.3–14.5)
RBC # FLD: 11.9 % — SIGNIFICANT CHANGE UP (ref 10.3–14.5)
SODIUM SERPL-SCNC: 140 MMOL/L — SIGNIFICANT CHANGE UP (ref 135–145)
SODIUM SERPL-SCNC: 140 MMOL/L — SIGNIFICANT CHANGE UP (ref 135–145)
TROPONIN T, HIGH SENSITIVITY RESULT: 179 NG/L — HIGH (ref 0–51)
WBC # BLD: 10.9 K/UL — HIGH (ref 3.8–10.5)
WBC # BLD: 7.7 K/UL — SIGNIFICANT CHANGE UP (ref 3.8–10.5)
WBC # FLD AUTO: 10.9 K/UL — HIGH (ref 3.8–10.5)
WBC # FLD AUTO: 7.7 K/UL — SIGNIFICANT CHANGE UP (ref 3.8–10.5)

## 2018-08-07 PROCEDURE — 71045 X-RAY EXAM CHEST 1 VIEW: CPT | Mod: 26

## 2018-08-07 PROCEDURE — 93010 ELECTROCARDIOGRAM REPORT: CPT

## 2018-08-07 PROCEDURE — 33534 CABG ARTERIAL TWO: CPT

## 2018-08-07 PROCEDURE — 33518 CABG ARTERY-VEIN TWO: CPT

## 2018-08-07 PROCEDURE — 99291 CRITICAL CARE FIRST HOUR: CPT

## 2018-08-07 PROCEDURE — 33508 ENDOSCOPIC VEIN HARVEST: CPT

## 2018-08-07 RX ORDER — POTASSIUM CHLORIDE 20 MEQ
10 PACKET (EA) ORAL
Qty: 0 | Refills: 0 | Status: DISCONTINUED | OUTPATIENT
Start: 2018-08-07 | End: 2018-08-13

## 2018-08-07 RX ORDER — ACETAMINOPHEN 500 MG
650 TABLET ORAL EVERY 6 HOURS
Qty: 0 | Refills: 0 | Status: DISCONTINUED | OUTPATIENT
Start: 2018-08-07 | End: 2018-08-15

## 2018-08-07 RX ORDER — SODIUM CHLORIDE 9 MG/ML
1000 INJECTION, SOLUTION INTRAVENOUS ONCE
Qty: 0 | Refills: 0 | Status: COMPLETED | OUTPATIENT
Start: 2018-08-07 | End: 2018-08-07

## 2018-08-07 RX ORDER — HYDROMORPHONE HYDROCHLORIDE 2 MG/ML
0.5 INJECTION INTRAMUSCULAR; INTRAVENOUS; SUBCUTANEOUS ONCE
Qty: 0 | Refills: 0 | Status: DISCONTINUED | OUTPATIENT
Start: 2018-08-07 | End: 2018-08-07

## 2018-08-07 RX ORDER — POTASSIUM CHLORIDE 20 MEQ
10 PACKET (EA) ORAL
Qty: 0 | Refills: 0 | Status: COMPLETED | OUTPATIENT
Start: 2018-08-07 | End: 2018-08-07

## 2018-08-07 RX ORDER — CEFUROXIME AXETIL 250 MG
1500 TABLET ORAL EVERY 8 HOURS
Qty: 0 | Refills: 0 | Status: COMPLETED | OUTPATIENT
Start: 2018-08-07 | End: 2018-08-09

## 2018-08-07 RX ORDER — FAMOTIDINE 10 MG/ML
20 INJECTION INTRAVENOUS EVERY 12 HOURS
Qty: 0 | Refills: 0 | Status: DISCONTINUED | OUTPATIENT
Start: 2018-08-07 | End: 2018-08-08

## 2018-08-07 RX ORDER — DEXTROSE 50 % IN WATER 50 %
25 SYRINGE (ML) INTRAVENOUS
Qty: 0 | Refills: 0 | Status: DISCONTINUED | OUTPATIENT
Start: 2018-08-07 | End: 2018-08-14

## 2018-08-07 RX ORDER — MAGNESIUM SULFATE 500 MG/ML
2 VIAL (ML) INJECTION ONCE
Qty: 0 | Refills: 0 | Status: COMPLETED | OUTPATIENT
Start: 2018-08-07 | End: 2018-08-07

## 2018-08-07 RX ORDER — ASPIRIN/CALCIUM CARB/MAGNESIUM 324 MG
81 TABLET ORAL DAILY
Qty: 0 | Refills: 0 | Status: DISCONTINUED | OUTPATIENT
Start: 2018-08-07 | End: 2018-08-08

## 2018-08-07 RX ORDER — POLYETHYLENE GLYCOL 3350 17 G/17G
17 POWDER, FOR SOLUTION ORAL DAILY
Qty: 0 | Refills: 0 | Status: DISCONTINUED | OUTPATIENT
Start: 2018-08-07 | End: 2018-08-15

## 2018-08-07 RX ORDER — SODIUM CHLORIDE 9 MG/ML
500 INJECTION, SOLUTION INTRAVENOUS
Qty: 0 | Refills: 0 | Status: DISCONTINUED | OUTPATIENT
Start: 2018-08-07 | End: 2018-08-08

## 2018-08-07 RX ORDER — ALBUMIN HUMAN 25 %
250 VIAL (ML) INTRAVENOUS ONCE
Qty: 0 | Refills: 0 | Status: COMPLETED | OUTPATIENT
Start: 2018-08-07 | End: 2018-08-07

## 2018-08-07 RX ORDER — DOCUSATE SODIUM 100 MG
100 CAPSULE ORAL THREE TIMES A DAY
Qty: 0 | Refills: 0 | Status: DISCONTINUED | OUTPATIENT
Start: 2018-08-07 | End: 2018-08-15

## 2018-08-07 RX ORDER — SODIUM CHLORIDE 9 MG/ML
1000 INJECTION INTRAMUSCULAR; INTRAVENOUS; SUBCUTANEOUS
Qty: 0 | Refills: 0 | Status: DISCONTINUED | OUTPATIENT
Start: 2018-08-07 | End: 2018-08-15

## 2018-08-07 RX ORDER — INSULIN HUMAN 100 [IU]/ML
2 INJECTION, SOLUTION SUBCUTANEOUS
Qty: 100 | Refills: 0 | Status: DISCONTINUED | OUTPATIENT
Start: 2018-08-07 | End: 2018-08-08

## 2018-08-07 RX ORDER — CHLORHEXIDINE GLUCONATE 213 G/1000ML
5 SOLUTION TOPICAL EVERY 4 HOURS
Qty: 0 | Refills: 0 | Status: DISCONTINUED | OUTPATIENT
Start: 2018-08-07 | End: 2018-08-13

## 2018-08-07 RX ORDER — ASPIRIN/CALCIUM CARB/MAGNESIUM 324 MG
300 TABLET ORAL ONCE
Qty: 0 | Refills: 0 | Status: COMPLETED | OUTPATIENT
Start: 2018-08-07

## 2018-08-07 RX ORDER — METOCLOPRAMIDE HCL 10 MG
10 TABLET ORAL ONCE
Qty: 0 | Refills: 0 | Status: COMPLETED | OUTPATIENT
Start: 2018-08-07 | End: 2018-08-07

## 2018-08-07 RX ORDER — DEXTROSE 50 % IN WATER 50 %
50 SYRINGE (ML) INTRAVENOUS
Qty: 0 | Refills: 0 | Status: DISCONTINUED | OUTPATIENT
Start: 2018-08-07 | End: 2018-08-14

## 2018-08-07 RX ORDER — ONDANSETRON 8 MG/1
4 TABLET, FILM COATED ORAL ONCE
Qty: 0 | Refills: 0 | Status: DISCONTINUED | OUTPATIENT
Start: 2018-08-07 | End: 2018-08-07

## 2018-08-07 RX ORDER — OXYCODONE HYDROCHLORIDE 5 MG/1
5 TABLET ORAL EVERY 6 HOURS
Qty: 0 | Refills: 0 | Status: DISCONTINUED | OUTPATIENT
Start: 2018-08-07 | End: 2018-08-10

## 2018-08-07 RX ORDER — MAGNESIUM SULFATE 500 MG/ML
2 VIAL (ML) INJECTION ONCE
Qty: 0 | Refills: 0 | Status: COMPLETED | OUTPATIENT
Start: 2018-08-07 | End: 2018-08-08

## 2018-08-07 RX ORDER — MEPERIDINE HYDROCHLORIDE 50 MG/ML
25 INJECTION INTRAMUSCULAR; INTRAVENOUS; SUBCUTANEOUS ONCE
Qty: 0 | Refills: 0 | Status: DISCONTINUED | OUTPATIENT
Start: 2018-08-07 | End: 2018-08-08

## 2018-08-07 RX ORDER — DEXMEDETOMIDINE HYDROCHLORIDE IN 0.9% SODIUM CHLORIDE 4 UG/ML
0.5 INJECTION INTRAVENOUS
Qty: 200 | Refills: 0 | Status: DISCONTINUED | OUTPATIENT
Start: 2018-08-07 | End: 2018-08-08

## 2018-08-07 RX ORDER — NICARDIPINE HYDROCHLORIDE 30 MG/1
5 CAPSULE, EXTENDED RELEASE ORAL
Qty: 40 | Refills: 0 | Status: DISCONTINUED | OUTPATIENT
Start: 2018-08-07 | End: 2018-08-08

## 2018-08-07 RX ORDER — DOBUTAMINE HCL 250MG/20ML
5 VIAL (ML) INTRAVENOUS
Qty: 500 | Refills: 0 | Status: DISCONTINUED | OUTPATIENT
Start: 2018-08-07 | End: 2018-08-08

## 2018-08-07 RX ORDER — NOREPINEPHRINE BITARTRATE/D5W 8 MG/250ML
0.05 PLASTIC BAG, INJECTION (ML) INTRAVENOUS
Qty: 8 | Refills: 0 | Status: DISCONTINUED | OUTPATIENT
Start: 2018-08-07 | End: 2018-08-08

## 2018-08-07 RX ORDER — ASPIRIN/CALCIUM CARB/MAGNESIUM 324 MG
300 TABLET ORAL ONCE
Qty: 0 | Refills: 0 | Status: COMPLETED | OUTPATIENT
Start: 2018-08-07 | End: 2018-08-07

## 2018-08-07 RX ADMIN — Medication 300 MILLIGRAM(S): at 20:32

## 2018-08-07 RX ADMIN — Medication 125 MILLILITER(S): at 17:27

## 2018-08-07 RX ADMIN — CHLORHEXIDINE GLUCONATE 5 MILLILITER(S): 213 SOLUTION TOPICAL at 21:34

## 2018-08-07 RX ADMIN — Medication 125 MILLILITER(S): at 22:29

## 2018-08-07 RX ADMIN — CHLORHEXIDINE GLUCONATE 5 MILLILITER(S): 213 SOLUTION TOPICAL at 06:40

## 2018-08-07 RX ADMIN — SODIUM CHLORIDE 100 MILLILITER(S): 9 INJECTION, SOLUTION INTRAVENOUS at 18:50

## 2018-08-07 RX ADMIN — Medication 125 MILLILITER(S): at 21:34

## 2018-08-07 RX ADMIN — Medication 100 MILLIEQUIVALENT(S): at 21:12

## 2018-08-07 RX ADMIN — Medication 10 MILLIGRAM(S): at 23:00

## 2018-08-07 RX ADMIN — INSULIN HUMAN 2 UNIT(S)/HR: 100 INJECTION, SOLUTION SUBCUTANEOUS at 18:48

## 2018-08-07 RX ADMIN — Medication 125 MILLILITER(S): at 23:29

## 2018-08-07 RX ADMIN — Medication 125 MILLILITER(S): at 18:37

## 2018-08-07 RX ADMIN — Medication 100 MILLIEQUIVALENT(S): at 18:06

## 2018-08-07 RX ADMIN — Medication 100 MILLIEQUIVALENT(S): at 19:16

## 2018-08-07 RX ADMIN — Medication 10 MILLIGRAM(S): at 18:45

## 2018-08-07 RX ADMIN — Medication 11.22 MICROGRAM(S)/KG/MIN: at 18:48

## 2018-08-07 RX ADMIN — PANTOPRAZOLE SODIUM 40 MILLIGRAM(S): 20 TABLET, DELAYED RELEASE ORAL at 07:09

## 2018-08-07 RX ADMIN — Medication 100 MILLIEQUIVALENT(S): at 16:21

## 2018-08-07 RX ADMIN — SODIUM CHLORIDE 4000 MILLILITER(S): 9 INJECTION, SOLUTION INTRAVENOUS at 18:39

## 2018-08-07 RX ADMIN — HYDROMORPHONE HYDROCHLORIDE 0.5 MILLIGRAM(S): 2 INJECTION INTRAMUSCULAR; INTRAVENOUS; SUBCUTANEOUS at 20:45

## 2018-08-07 RX ADMIN — Medication 100 MILLIGRAM(S): at 18:02

## 2018-08-07 RX ADMIN — Medication 100 MILLIEQUIVALENT(S): at 16:33

## 2018-08-07 RX ADMIN — Medication 25 MILLIGRAM(S): at 06:44

## 2018-08-07 RX ADMIN — Medication 100 MILLIEQUIVALENT(S): at 17:00

## 2018-08-07 RX ADMIN — Medication 100 MILLIEQUIVALENT(S): at 15:10

## 2018-08-07 RX ADMIN — AMLODIPINE BESYLATE 2.5 MILLIGRAM(S): 2.5 TABLET ORAL at 06:44

## 2018-08-07 RX ADMIN — Medication 50 GRAM(S): at 18:34

## 2018-08-07 RX ADMIN — Medication 125 MILLILITER(S): at 21:00

## 2018-08-07 RX ADMIN — CHLORHEXIDINE GLUCONATE 5 MILLILITER(S): 213 SOLUTION TOPICAL at 18:46

## 2018-08-07 RX ADMIN — Medication 100 MILLIEQUIVALENT(S): at 18:32

## 2018-08-07 RX ADMIN — SODIUM CHLORIDE 100 MILLILITER(S): 9 INJECTION, SOLUTION INTRAVENOUS at 18:51

## 2018-08-07 RX ADMIN — HYDROMORPHONE HYDROCHLORIDE 0.5 MILLIGRAM(S): 2 INJECTION INTRAMUSCULAR; INTRAVENOUS; SUBCUTANEOUS at 20:30

## 2018-08-07 RX ADMIN — Medication 100 MILLIEQUIVALENT(S): at 15:41

## 2018-08-07 RX ADMIN — FAMOTIDINE 20 MILLIGRAM(S): 10 INJECTION INTRAVENOUS at 18:45

## 2018-08-07 RX ADMIN — SODIUM CHLORIDE 10 MILLILITER(S): 9 INJECTION INTRAMUSCULAR; INTRAVENOUS; SUBCUTANEOUS at 18:52

## 2018-08-07 NOTE — BRIEF OPERATIVE NOTE - PROCEDURE
<<-----Click on this checkbox to enter Procedure Coronary artery bypass grafting  08/07/2018  CABG X3 (LIM[facility]->LAD, SVG->  [facility]  08/07/2018  CABG X4 (LIMA->LAD->diag, SVG-> OM, SVG->RCA )  Active  Mendy Hilliard

## 2018-08-07 NOTE — AIRWAY REMOVAL NOTE  ADULT & PEDS - ARTIFICAL AIRWAY REMOVAL COMMENTS
Written order for extubation verified. The patient was identified by full name and birth date compared to the identification band. Present during the procedure was KATHYA Anguiano.

## 2018-08-07 NOTE — PROGRESS NOTE ADULT - SUBJECTIVE AND OBJECTIVE BOX
MINDI COOPER   MRN#: [id number]     The patient is a 73y Male who was seen, evaluated, & examined with the CTICU staff on rounds and later in the day with a multidisciplinary care plan formulated & implemented.  All available clinical, laboratory, radiographic, pharmacologic, and electrocardiographic data were reviewed & analyzed.      The patient was in the CTICU in critical condition secondary to acute hypoxic respiratory failure-persistent cardiopulmonary dysfunction, hemodynamically significant hypovolemia-shock, and hyperlactatemia-acidosis S/P C3L.      Respiratory failure required full ventilatory support, the following of ABG’s with A-line monitoring, continuous pulse oximetry monitoring, and an IV Propofol infusion for support & to evaluate for & prevent further decompensation secondary to persistent cardiopulmonary dysfunction.     Invasive hemodynamic monitoring with an A-line was required for the following of  continuous MAP/BP monitoring to ensure adequate cardiovascular support and to evaluate for & help prevent decompensation while receiving intermittent volume expansion and an IV Levophed drip secondary to hemodynamically significant hypovolemia-shock and hyperlactatemia-acidosis.       Patient required critical care management and I provided 30 minutes of non-continuous care to the patient.  Discussed at length with the CTICU staff and helped coordinate care.

## 2018-08-08 DIAGNOSIS — I25.10 ATHEROSCLEROTIC HEART DISEASE OF NATIVE CORONARY ARTERY WITHOUT ANGINA PECTORIS: ICD-10-CM

## 2018-08-08 LAB
ALBUMIN SERPL ELPH-MCNC: 4.8 G/DL — SIGNIFICANT CHANGE UP (ref 3.3–5)
ALP SERPL-CCNC: 36 U/L — LOW (ref 40–120)
ALT FLD-CCNC: 44 U/L — SIGNIFICANT CHANGE UP (ref 10–45)
ANION GAP SERPL CALC-SCNC: 11 MMOL/L — SIGNIFICANT CHANGE UP (ref 5–17)
APTT BLD: 41.5 SEC — HIGH (ref 27.5–37.4)
AST SERPL-CCNC: 55 U/L — HIGH (ref 10–40)
BASOPHILS # BLD AUTO: 0 K/UL — SIGNIFICANT CHANGE UP (ref 0–0.2)
BASOPHILS NFR BLD AUTO: 0.1 % — SIGNIFICANT CHANGE UP (ref 0–2)
BILIRUB SERPL-MCNC: 1.3 MG/DL — HIGH (ref 0.2–1.2)
BUN SERPL-MCNC: 12 MG/DL — SIGNIFICANT CHANGE UP (ref 7–23)
CALCIUM SERPL-MCNC: 10.3 MG/DL — SIGNIFICANT CHANGE UP (ref 8.4–10.5)
CHLORIDE SERPL-SCNC: 101 MMOL/L — SIGNIFICANT CHANGE UP (ref 96–108)
CK MB BLD-MCNC: 4.6 % — HIGH (ref 0–3.5)
CK MB CFR SERPL CALC: 14.8 NG/ML — HIGH (ref 0–6.7)
CK SERPL-CCNC: 323 U/L — HIGH (ref 30–200)
CO2 SERPL-SCNC: 22 MMOL/L — SIGNIFICANT CHANGE UP (ref 22–31)
CREAT SERPL-MCNC: 0.9 MG/DL — SIGNIFICANT CHANGE UP (ref 0.5–1.3)
EOSINOPHIL # BLD AUTO: 0 K/UL — SIGNIFICANT CHANGE UP (ref 0–0.5)
EOSINOPHIL NFR BLD AUTO: 0.1 % — SIGNIFICANT CHANGE UP (ref 0–6)
GAS PNL BLDA: SIGNIFICANT CHANGE UP
GLUCOSE BLDC GLUCOMTR-MCNC: 113 MG/DL — HIGH (ref 70–99)
GLUCOSE BLDC GLUCOMTR-MCNC: 115 MG/DL — HIGH (ref 70–99)
GLUCOSE BLDC GLUCOMTR-MCNC: 127 MG/DL — HIGH (ref 70–99)
GLUCOSE BLDC GLUCOMTR-MCNC: 128 MG/DL — HIGH (ref 70–99)
GLUCOSE BLDC GLUCOMTR-MCNC: 129 MG/DL — HIGH (ref 70–99)
GLUCOSE BLDC GLUCOMTR-MCNC: 158 MG/DL — HIGH (ref 70–99)
GLUCOSE BLDC GLUCOMTR-MCNC: 230 MG/DL — HIGH (ref 70–99)
GLUCOSE BLDC GLUCOMTR-MCNC: 230 MG/DL — HIGH (ref 70–99)
GLUCOSE SERPL-MCNC: 133 MG/DL — HIGH (ref 70–99)
HCT VFR BLD CALC: 26.3 % — LOW (ref 39–50)
HGB BLD-MCNC: 8.9 G/DL — LOW (ref 13–17)
INR BLD: 1.27 RATIO — HIGH (ref 0.88–1.16)
LYMPHOCYTES # BLD AUTO: 0.7 K/UL — LOW (ref 1–3.3)
LYMPHOCYTES # BLD AUTO: 5.8 % — LOW (ref 13–44)
MAGNESIUM SERPL-MCNC: 1.9 MG/DL — SIGNIFICANT CHANGE UP (ref 1.6–2.6)
MCHC RBC-ENTMCNC: 32.3 PG — SIGNIFICANT CHANGE UP (ref 27–34)
MCHC RBC-ENTMCNC: 33.9 GM/DL — SIGNIFICANT CHANGE UP (ref 32–36)
MCV RBC AUTO: 95.4 FL — SIGNIFICANT CHANGE UP (ref 80–100)
MONOCYTES # BLD AUTO: 0.9 K/UL — SIGNIFICANT CHANGE UP (ref 0–0.9)
MONOCYTES NFR BLD AUTO: 7.6 % — SIGNIFICANT CHANGE UP (ref 2–14)
NEUTROPHILS # BLD AUTO: 10.1 K/UL — HIGH (ref 1.8–7.4)
NEUTROPHILS NFR BLD AUTO: 86.3 % — HIGH (ref 43–77)
PHOSPHATE SERPL-MCNC: 2.3 MG/DL — LOW (ref 2.5–4.5)
PLATELET # BLD AUTO: 124 K/UL — LOW (ref 150–400)
POTASSIUM SERPL-MCNC: 4.2 MMOL/L — SIGNIFICANT CHANGE UP (ref 3.5–5.3)
POTASSIUM SERPL-SCNC: 4.2 MMOL/L — SIGNIFICANT CHANGE UP (ref 3.5–5.3)
PROT SERPL-MCNC: 6.3 G/DL — SIGNIFICANT CHANGE UP (ref 6–8.3)
PROTHROM AB SERPL-ACNC: 13.8 SEC — HIGH (ref 9.8–12.7)
RBC # BLD: 2.75 M/UL — LOW (ref 4.2–5.8)
RBC # FLD: 11.8 % — SIGNIFICANT CHANGE UP (ref 10.3–14.5)
SODIUM SERPL-SCNC: 134 MMOL/L — LOW (ref 135–145)
TROPONIN T, HIGH SENSITIVITY RESULT: 245 NG/L — HIGH (ref 0–51)
WBC # BLD: 11.6 K/UL — HIGH (ref 3.8–10.5)
WBC # FLD AUTO: 11.6 K/UL — HIGH (ref 3.8–10.5)

## 2018-08-08 PROCEDURE — 93010 ELECTROCARDIOGRAM REPORT: CPT

## 2018-08-08 PROCEDURE — 99291 CRITICAL CARE FIRST HOUR: CPT

## 2018-08-08 PROCEDURE — 71045 X-RAY EXAM CHEST 1 VIEW: CPT | Mod: 26

## 2018-08-08 RX ORDER — ALBUMIN HUMAN 25 %
250 VIAL (ML) INTRAVENOUS ONCE
Qty: 0 | Refills: 0 | Status: COMPLETED | OUTPATIENT
Start: 2018-08-08 | End: 2018-08-07

## 2018-08-08 RX ORDER — DEXTROSE 50 % IN WATER 50 %
25 SYRINGE (ML) INTRAVENOUS ONCE
Qty: 0 | Refills: 0 | Status: DISCONTINUED | OUTPATIENT
Start: 2018-08-08 | End: 2018-08-14

## 2018-08-08 RX ORDER — SODIUM CHLORIDE 9 MG/ML
1000 INJECTION, SOLUTION INTRAVENOUS
Qty: 0 | Refills: 0 | Status: DISCONTINUED | OUTPATIENT
Start: 2018-08-08 | End: 2018-08-14

## 2018-08-08 RX ORDER — POTASSIUM CHLORIDE 20 MEQ
10 PACKET (EA) ORAL
Qty: 0 | Refills: 0 | Status: COMPLETED | OUTPATIENT
Start: 2018-08-08 | End: 2018-08-08

## 2018-08-08 RX ORDER — ENOXAPARIN SODIUM 100 MG/ML
40 INJECTION SUBCUTANEOUS DAILY
Qty: 0 | Refills: 0 | Status: DISCONTINUED | OUTPATIENT
Start: 2018-08-08 | End: 2018-08-13

## 2018-08-08 RX ORDER — ATORVASTATIN CALCIUM 80 MG/1
40 TABLET, FILM COATED ORAL AT BEDTIME
Qty: 0 | Refills: 0 | Status: DISCONTINUED | OUTPATIENT
Start: 2018-08-08 | End: 2018-08-15

## 2018-08-08 RX ORDER — INSULIN LISPRO 100/ML
VIAL (ML) SUBCUTANEOUS
Qty: 0 | Refills: 0 | Status: DISCONTINUED | OUTPATIENT
Start: 2018-08-08 | End: 2018-08-14

## 2018-08-08 RX ORDER — GLUCAGON INJECTION, SOLUTION 0.5 MG/.1ML
1 INJECTION, SOLUTION SUBCUTANEOUS ONCE
Qty: 0 | Refills: 0 | Status: DISCONTINUED | OUTPATIENT
Start: 2018-08-08 | End: 2018-08-14

## 2018-08-08 RX ORDER — HYDROMORPHONE HYDROCHLORIDE 2 MG/ML
0.5 INJECTION INTRAMUSCULAR; INTRAVENOUS; SUBCUTANEOUS ONCE
Qty: 0 | Refills: 0 | Status: DISCONTINUED | OUTPATIENT
Start: 2018-08-08 | End: 2018-08-08

## 2018-08-08 RX ORDER — PANTOPRAZOLE SODIUM 20 MG/1
40 TABLET, DELAYED RELEASE ORAL
Qty: 0 | Refills: 0 | Status: DISCONTINUED | OUTPATIENT
Start: 2018-08-08 | End: 2018-08-15

## 2018-08-08 RX ORDER — INSULIN LISPRO 100/ML
VIAL (ML) SUBCUTANEOUS AT BEDTIME
Qty: 0 | Refills: 0 | Status: DISCONTINUED | OUTPATIENT
Start: 2018-08-08 | End: 2018-08-14

## 2018-08-08 RX ORDER — METOPROLOL TARTRATE 50 MG
25 TABLET ORAL EVERY 12 HOURS
Qty: 0 | Refills: 0 | Status: DISCONTINUED | OUTPATIENT
Start: 2018-08-08 | End: 2018-08-10

## 2018-08-08 RX ORDER — DEXTROSE 50 % IN WATER 50 %
12.5 SYRINGE (ML) INTRAVENOUS ONCE
Qty: 0 | Refills: 0 | Status: DISCONTINUED | OUTPATIENT
Start: 2018-08-08 | End: 2018-08-14

## 2018-08-08 RX ORDER — DEXTROSE 50 % IN WATER 50 %
15 SYRINGE (ML) INTRAVENOUS ONCE
Qty: 0 | Refills: 0 | Status: DISCONTINUED | OUTPATIENT
Start: 2018-08-08 | End: 2018-08-14

## 2018-08-08 RX ORDER — SODIUM CHLORIDE 9 MG/ML
1000 INJECTION, SOLUTION INTRAVENOUS
Qty: 0 | Refills: 0 | Status: DISCONTINUED | OUTPATIENT
Start: 2018-08-08 | End: 2018-08-08

## 2018-08-08 RX ORDER — METOCLOPRAMIDE HCL 10 MG
10 TABLET ORAL ONCE
Qty: 0 | Refills: 0 | Status: COMPLETED | OUTPATIENT
Start: 2018-08-08 | End: 2018-08-07

## 2018-08-08 RX ORDER — ASPIRIN/CALCIUM CARB/MAGNESIUM 324 MG
325 TABLET ORAL DAILY
Qty: 0 | Refills: 0 | Status: DISCONTINUED | OUTPATIENT
Start: 2018-08-08 | End: 2018-08-15

## 2018-08-08 RX ADMIN — Medication 25 MILLIGRAM(S): at 17:00

## 2018-08-08 RX ADMIN — Medication 100 MILLIGRAM(S): at 17:35

## 2018-08-08 RX ADMIN — Medication 100 MILLIEQUIVALENT(S): at 05:24

## 2018-08-08 RX ADMIN — Medication 50 GRAM(S): at 01:45

## 2018-08-08 RX ADMIN — Medication 25 MILLIGRAM(S): at 09:39

## 2018-08-08 RX ADMIN — Medication 100 MILLIEQUIVALENT(S): at 03:22

## 2018-08-08 RX ADMIN — Medication 325 MILLIGRAM(S): at 11:43

## 2018-08-08 RX ADMIN — Medication 100 MILLIGRAM(S): at 22:24

## 2018-08-08 RX ADMIN — OXYCODONE HYDROCHLORIDE 5 MILLIGRAM(S): 5 TABLET ORAL at 15:31

## 2018-08-08 RX ADMIN — Medication 100 MILLIEQUIVALENT(S): at 01:16

## 2018-08-08 RX ADMIN — PANTOPRAZOLE SODIUM 40 MILLIGRAM(S): 20 TABLET, DELAYED RELEASE ORAL at 11:42

## 2018-08-08 RX ADMIN — Medication 4: at 17:00

## 2018-08-08 RX ADMIN — Medication 100 MILLIEQUIVALENT(S): at 05:22

## 2018-08-08 RX ADMIN — FAMOTIDINE 20 MILLIGRAM(S): 10 INJECTION INTRAVENOUS at 06:24

## 2018-08-08 RX ADMIN — HYDROMORPHONE HYDROCHLORIDE 0.5 MILLIGRAM(S): 2 INJECTION INTRAMUSCULAR; INTRAVENOUS; SUBCUTANEOUS at 01:50

## 2018-08-08 RX ADMIN — Medication 2: at 13:09

## 2018-08-08 RX ADMIN — Medication 100 MILLIEQUIVALENT(S): at 01:15

## 2018-08-08 RX ADMIN — HYDROMORPHONE HYDROCHLORIDE 0.5 MILLIGRAM(S): 2 INJECTION INTRAMUSCULAR; INTRAVENOUS; SUBCUTANEOUS at 02:05

## 2018-08-08 RX ADMIN — ATORVASTATIN CALCIUM 40 MILLIGRAM(S): 80 TABLET, FILM COATED ORAL at 22:24

## 2018-08-08 RX ADMIN — OXYCODONE HYDROCHLORIDE 5 MILLIGRAM(S): 5 TABLET ORAL at 16:20

## 2018-08-08 RX ADMIN — Medication 100 MILLIGRAM(S): at 01:14

## 2018-08-08 RX ADMIN — ENOXAPARIN SODIUM 40 MILLIGRAM(S): 100 INJECTION SUBCUTANEOUS at 11:42

## 2018-08-08 RX ADMIN — Medication 100 MILLIGRAM(S): at 09:37

## 2018-08-08 RX ADMIN — Medication 100 MILLIGRAM(S): at 06:24

## 2018-08-08 RX ADMIN — CHLORHEXIDINE GLUCONATE 5 MILLILITER(S): 213 SOLUTION TOPICAL at 22:24

## 2018-08-08 NOTE — PROGRESS NOTE ADULT - SUBJECTIVE AND OBJECTIVE BOX
Seen and examined. Resting comfortable. No acute events overnight  VITAL SIGNS    Telemetry:  SR  Vital Signs Last 24 Hrs  T(C): 36.7 (18 @ 12:59), Max: 37.5 (18 @ 11:00)  T(F): 98.1 (18 @ 12:59), Max: 99.5 (18 @ 11:00)  HR: 76 (18 @ 12:59) (70 - 104)  BP: 130/69 (18 @ 12:59) (130/69 - 130/69)  RR: 16 (18 @ 12:59) (11 - 32)  SpO2: 97% (18 @ 12:59) (97% - 100%)             @ 07:01  -   @ 07:00  --------------------------------------------------------  IN: 7397.9 mL / OUT: 9155 mL / NET: -1757.1 mL     @ 07:01  -   @ 16:52  --------------------------------------------------------  IN: 511.3 mL / OUT: 770 mL / NET: -258.7 mL       Daily     Daily Weight in k.7 (08 Aug 2018 08:44)  Admit Wt: Drug Dosing Weight  Height (cm): 172.7 (07 Aug 2018 14:15)  Weight (kg): 74.8 (07 Aug 2018 14:15)  BMI (kg/m2): 25.1 (07 Aug 2018 14:15)  BSA (m2): 1.88 (07 Aug 2018 14:15)    Bilirubin Total, Serum: 1.3 mg/dL ( @ 00:53)    CAPILLARY BLOOD GLUCOSE  76 (08 Aug 2018 08:00)  113 (08 Aug 2018 07:00)  115 (08 Aug 2018 06:00)  111 (08 Aug 2018 05:00)  129 (08 Aug 2018 04:00)  128 (08 Aug 2018 03:00)  126 (08 Aug 2018 02:00)  127 (08 Aug 2018 01:00)  125 (08 Aug 2018 00:00)  141 (07 Aug 2018 23:00)  141 (07 Aug 2018 22:00)  161 (07 Aug 2018 21:00)  185 (07 Aug 2018 20:00)  179 (07 Aug 2018 19:00)  139 (07 Aug 2018 17:30)      POCT Blood Glucose.: 230 mg/dL (08 Aug 2018 16:35)  POCT Blood Glucose.: 158 mg/dL (08 Aug 2018 13:07)  POCT Blood Glucose.: 113 mg/dL (08 Aug 2018 06:54)  POCT Blood Glucose.: 115 mg/dL (08 Aug 2018 05:50)  POCT Blood Glucose.: 129 mg/dL (08 Aug 2018 04:05)  POCT Blood Glucose.: 128 mg/dL (08 Aug 2018 03:02)  POCT Blood Glucose.: 127 mg/dL (08 Aug 2018 01:03)  POCT Blood Glucose.: 185 mg/dL (07 Aug 2018 19:56)          MEDICATIONS  acetaminophen   Tablet. 650 milliGRAM(s) Oral every 6 hours PRN  aspirin enteric coated 325 milliGRAM(s) Oral daily  atorvastatin 40 milliGRAM(s) Oral at bedtime  cefuroxime  IVPB 1500 milliGRAM(s) IV Intermittent every 8 hours  chlorhexidine 0.12% Liquid 5 milliLiter(s) Swish and Spit every 4 hours  docusate sodium 100 milliGRAM(s) Oral three times a day  enoxaparin Injectable 40 milliGRAM(s) SubCutaneous daily  glucagon  Injectable 1 milliGRAM(s) IntraMuscular once PRN  insulin lispro (HumaLOG) corrective regimen sliding scale   SubCutaneous three times a day before meals  insulin lispro (HumaLOG) corrective regimen sliding scale   SubCutaneous at bedtime  metoprolol tartrate 25 milliGRAM(s) Oral every 12 hours  oxyCODONE    IR 5 milliGRAM(s) Oral every 6 hours PRN  pantoprazole    Tablet 40 milliGRAM(s) Oral before breakfast  polyethylene glycol 3350 17 Gram(s) Oral daily  sodium chloride 0.9%. 1000 milliLiter(s) IV Continuous <Continuous>      PHYSICAL EXAM    Subjective: c/o incisional pain   Neurology: alert and oriented x 3, nonfocal, no gross deficits  CV : s1s1 reg no MRGS   Sternal Wound :  CDI , Stable +pw   Lungs: CTA, equal chest expansion Left pl chest 50ml/ serous sang/ mediastianal 120ml so far   Abdomen: soft, nontender, nondistended, positive bowel sounds, last bowel movement  pre op   :   voids  Extremities:   Rt leg EVG ,no edema,   b/l groins no hematoma, distal pulses  b/l , no calf tenderness b/l , warm and perfused b/l    LABS      134<L>  |  101  |  12  ----------------------------<  133<H>  4.2   |  22  |  0.90    Ca    10.3      08 Aug 2018 00:53  Phos  2.3       Mg     1.9         TPro  6.3  /  Alb  4.8  /  TBili  1.3<H>  /  DBili  x   /  AST  55<H>  /  ALT  44  /  AlkPhos  36<L>                                   8.9    11.6  )-----------( 124      ( 08 Aug 2018 00:50 )             26.3          PT/INR - ( 08 Aug 2018 00:50 )   PT: 13.8 sec;   INR: 1.27 ratio         PTT - ( 08 Aug 2018 00:50 )  PTT:41.5 sec         PAST MEDICAL & SURGICAL HISTORY:  Hypertension, unspecified type  No significant past surgical history       Physical Therapy Rec:   Home  [  ]   Home w/ PT  [x  ]  Rehab  [  ]

## 2018-08-08 NOTE — PROGRESS NOTE ADULT - SUBJECTIVE AND OBJECTIVE BOX
Operation  C4L   ef 40   POD  1   Narrative  pt seen and extubated , pt post op with excessive urine output. pt with 1 :1 replacement of urine output  pt electrolytes replaced and appropriate corrected   pt pain management with prn meds.  chest tube with min output     Vital Signs Last 24 Hrs  T(C): 36.6 (08 Aug 2018 03:00), Max: 36.6 (07 Aug 2018 18:00)  T(F): 97.9 (08 Aug 2018 03:00), Max: 97.9 (07 Aug 2018 18:00)  HR: 88 (08 Aug 2018 04:45) (64 - 104)  BP: --  BP(mean): --  RR: 13 (08 Aug 2018 04:45) (11 - 28)  SpO2: 98% (08 Aug 2018 04:45) (98% - 100%)  I&O's Detail    06 Aug 2018 07:  -  07 Aug 2018 07:00  --------------------------------------------------------  IN:    heparin  Infusion.: 106 mL    Oral Fluid: 500 mL  Total IN: 606 mL    OUT:  Total OUT: 0 mL    Total NET: 606 mL      07 Aug 2018 07:  -  08 Aug 2018 05:32  --------------------------------------------------------  IN:    Albumin 5%  - 250 mL: 1000 mL    dexmedetomidine Infusion: 8 mL    DOBUTamine Infusion: 124 mL    insulin Infusion: 43 mL    IV PiggyBack: 800 mL    Lactated Ringers IV Bolus: 1000 mL    multiple electrolytes Injection Type 1: 2700 mL    niCARdipine Infusion: 15 mL    norepinephrine Infusion: 25 mL    Packed Red Blood Cells: 250 mL    sodium chloride 0.9%: 400 mL    sodium chloride 0.9%.: 140 mL  Total IN: 6505 mL    OUT:    Chest Tube: 100 mL    Chest Tube: 680 mL    Indwelling Catheter - Urethral: 7245 mL  Total OUT: 8025 mL    Total NET: -1520 mL      I&O's Summary    06 Aug 2018 07:01  -  07 Aug 2018 07:00  --------------------------------------------------------  IN: 606 mL / OUT: 0 mL / NET: 606 mL    07 Aug 2018 07:01  -  08 Aug 2018 05:32  --------------------------------------------------------  IN: 6505 mL / OUT: 8025 mL / NET: -1520 mL        LABS:  CBC Full  -  ( 08 Aug 2018 00:50 )  WBC Count : 11.6 K/uL  Hemoglobin : 8.9 g/dL  Hematocrit : 26.3 %  Platelet Count - Automated : 124 K/uL  Mean Cell Volume : 95.4 fl  Mean Cell Hemoglobin : 32.3 pg  Mean Cell Hemoglobin Concentration : 33.9 gm/dL  Auto Neutrophil # : 10.1 K/uL  Auto Lymphocyte # : 0.7 K/uL  Auto Monocyte # : 0.9 K/uL  Auto Eosinophil # : 0.0 K/uL  Auto Basophil # : 0.0 K/uL  Auto Neutrophil % : 86.3 %  Auto Lymphocyte % : 5.8 %  Auto Monocyte % : 7.6 %  Auto Eosinophil % : 0.1 %  Auto Basophil % : 0.1 %        134<L>  |  101  |  12  ----------------------------<  133<H>  4.2   |  22  |  0.90    Ca    10.3      08 Aug 2018 00:53  Phos  2.3       Mg     1.9         TPro  6.3  /  Alb  4.8  /  TBili  1.3<H>  /  DBili  x   /  AST  55<H>  /  ALT  44  /  AlkPhos  36<L>  08    PT/INR - ( 08 Aug 2018 00:50 )   PT: 13.8 sec;   INR: 1.27 ratio         PTT - ( 08 Aug 2018 00:50 )  PTT:41.5 sec    Daily Height in cm: 172.7 (07 Aug 2018 14:15)    Daily     MEDICATIONS  (STANDING):  aspirin enteric coated 81 milliGRAM(s) Oral daily  cefuroxime  IVPB 1500 milliGRAM(s) IV Intermittent every 8 hours  chlorhexidine 0.12% Liquid 5 milliLiter(s) Swish and Spit every 4 hours  dexmedetomidine Infusion 0.5 MICROgram(s)/kG/Hr (9.35 mL/Hr) IV Continuous <Continuous>  dextrose 50% Injectable 50 milliLiter(s) IV Push every 15 minutes  dextrose 50% Injectable 25 milliLiter(s) IV Push every 15 minutes  DOBUTamine Infusion 5 MICROgram(s)/kG/Min (11.22 mL/Hr) IV Continuous <Continuous>  docusate sodium 100 milliGRAM(s) Oral three times a day  famotidine Injectable 20 milliGRAM(s) IV Push every 12 hours  insulin Infusion 2 Unit(s)/Hr (2 mL/Hr) IV Continuous <Continuous>  meperidine     Injectable 25 milliGRAM(s) IV Push once  multiple electrolytes Injection Type 1 1000 milliLiter(s) (400 mL/Hr) IV Continuous <Continuous>  niCARdipine Infusion 5 mG/Hr (25 mL/Hr) IV Continuous <Continuous>  norepinephrine Infusion 0.05 MICROgram(s)/kG/Min (7.013 mL/Hr) IV Continuous <Continuous>  polyethylene glycol 3350 17 Gram(s) Oral daily  potassium chloride  10 mEq/50 mL IVPB 10 milliEquivalent(s) IV Intermittent every 1 hour  potassium chloride  10 mEq/50 mL IVPB 10 milliEquivalent(s) IV Intermittent every 1 hour  potassium chloride  10 mEq/50 mL IVPB 10 milliEquivalent(s) IV Intermittent every 1 hour  potassium chloride  10 mEq/50 mL IVPB 10 milliEquivalent(s) IV Intermittent every 1 hour  sodium chloride 0.9% 500 milliLiter(s) (100 mL/Hr) IV Continuous <Continuous>  sodium chloride 0.9%. 1000 milliLiter(s) (10 mL/Hr) IV Continuous <Continuous>    MEDICATIONS  (PRN):  acetaminophen   Tablet. 650 milliGRAM(s) Oral every 6 hours PRN Mild Pain (1 - 3)  oxyCODONE    IR 5 milliGRAM(s) Oral every 6 hours PRN Moderate Pain (4 - 6)      General: A+Ox3, in NAD  Chest: sternal dressing C/D/I  Heart: S1, S2, RRR  Lungs: CTA B/L, without W/R/R  Abdomen: Soft, ND, NT, positive BS  Extremeties: without edema B/L LE, positive DP pulses B/L     Does the patient have a history of CHF:  none  -If yes, systolic or diastolic:  -pre-operative EF:  45    Extubation within 24 hours:  yes     Does the patient have a history of kidney disease: no   -give CKD stage if applicable:  -what is patient's baseline Creatinine:  1.01    Beta Blockers contraindicated for the first 24 hours due to vasopressor/inotrpic medication: no   -If on pressors, indication:  pt is on  at 3 mcg       Did the patient receive transfusion of blood and/or products: yes   -If yes, indication:   acute blood loss post op     DVT PPX: pas stocking    Gin-operative antibiotics discontinued within 48 hours of CTU admission: yes   -name/date/time of discontinue   zinacef   s/p c4l  ef nl    GI / DVT prophylaxis. keep K>4, mag >2.0 -wean pressors - asa, statin - glycemic control -cont post op ABX -d/c chest tube- pain management.     RADIOLOGY & ADDITIONAL TESTS:    Patient care discussed on morning interdisciplinary rounds with CTS team.

## 2018-08-08 NOTE — DIETITIAN INITIAL EVALUATION ADULT. - NS AS NUTRI INTERV ED CONTENT
Increased nutrient needs post op for wound healing reviewed with Pt. Increased protein needs, nutrient dense meals and snacks were discussed. Pt denies need for heart healthy education at this time. RD remains available to monitor PO intake, wt, labs, diet education review.

## 2018-08-08 NOTE — PROGRESS NOTE ADULT - SUBJECTIVE AND OBJECTIVE BOX
Subjective: Patient seen and examined. No new events except as noted.   POD 1 post CABG x 4  awake alert extubated NAD  MEDICATIONS:  MEDICATIONS  (STANDING):  aspirin enteric coated 81 milliGRAM(s) Oral daily  cefuroxime  IVPB 1500 milliGRAM(s) IV Intermittent every 8 hours  chlorhexidine 0.12% Liquid 5 milliLiter(s) Swish and Spit every 4 hours  dexmedetomidine Infusion 0.5 MICROgram(s)/kG/Hr (9.35 mL/Hr) IV Continuous <Continuous>  dextrose 50% Injectable 50 milliLiter(s) IV Push every 15 minutes  dextrose 50% Injectable 25 milliLiter(s) IV Push every 15 minutes  DOBUTamine Infusion 5 MICROgram(s)/kG/Min (11.22 mL/Hr) IV Continuous <Continuous>  docusate sodium 100 milliGRAM(s) Oral three times a day  enoxaparin Injectable 40 milliGRAM(s) SubCutaneous daily  famotidine Injectable 20 milliGRAM(s) IV Push every 12 hours  insulin Infusion 2 Unit(s)/Hr (2 mL/Hr) IV Continuous <Continuous>  meperidine     Injectable 25 milliGRAM(s) IV Push once  multiple electrolytes Injection Type 1 1000 milliLiter(s) (400 mL/Hr) IV Continuous <Continuous>  niCARdipine Infusion 5 mG/Hr (25 mL/Hr) IV Continuous <Continuous>  norepinephrine Infusion 0.05 MICROgram(s)/kG/Min (7.013 mL/Hr) IV Continuous <Continuous>  polyethylene glycol 3350 17 Gram(s) Oral daily  potassium chloride  10 mEq/50 mL IVPB 10 milliEquivalent(s) IV Intermittent every 1 hour  potassium chloride  10 mEq/50 mL IVPB 10 milliEquivalent(s) IV Intermittent every 1 hour  potassium chloride  10 mEq/50 mL IVPB 10 milliEquivalent(s) IV Intermittent every 1 hour  sodium chloride 0.9% 500 milliLiter(s) (100 mL/Hr) IV Continuous <Continuous>  sodium chloride 0.9%. 1000 milliLiter(s) (10 mL/Hr) IV Continuous <Continuous>      PHYSICAL EXAM:  T(C): 37.2 (08-08-18 @ 07:00), Max: 37.2 (08-08-18 @ 07:00)  HR: 79 (08-08-18 @ 09:00) (64 - 104)  BP: 120/70  RR: 26 (08-08-18 @ 09:00) (11 - 29)  SpO2: 100% (08-08-18 @ 09:00) (98% - 100%)  Wt(kg): --  I&O's Summary    07 Aug 2018 07:01  -  08 Aug 2018 07:00  --------------------------------------------------------  IN: 7397.9 mL / OUT: 9155 mL / NET: -1757.1 mL    08 Aug 2018 07:01  -  08 Aug 2018 09:15  --------------------------------------------------------  IN: 231.3 mL / OUT: 270 mL / NET: -38.7 mL      Height (cm): 172.7 (08-07 @ 14:15)  Weight (kg): 74.8 (08-07 @ 14:15)  BMI (kg/m2): 25.1 (08-07 @ 14:15)  BSA (m2): 1.88 (08-07 @ 14:15)    Appearance: Normal	  HEENT:   Normal oral mucosa, PERRL, EOMI	  Cardiovascular: Normal S1 S2, No JVD, No murmurs ,  Respiratory: Lungs decreased BS left base  Gastrointestinal:  Soft, Non-tender, + BS	  Psychiatry:  Mood & affect appropriate  Ext: No edema  Peripheral pulses palpable 2+ bilaterally      LABS:    CARDIAC MARKERS:  CARDIAC MARKERS ( 08 Aug 2018 00:53 )  x     / x     / 323 U/L / x     / 14.8 ng/mL  CARDIAC MARKERS ( 07 Aug 2018 14:58 )  x     / x     / 278 U/L / x     / 18.9 ng/mL                                8.9    11.6  )-----------( 124      ( 08 Aug 2018 00:50 )             26.3     08-08    134<L>  |  101  |  12  ----------------------------<  133<H>  4.2   |  22  |  0.90    Ca    10.3      08 Aug 2018 00:53  Phos  2.3     08-08  Mg     1.9     08-08    TPro  6.3  /  Alb  4.8  /  TBili  1.3<H>  /  DBili  x   /  AST  55<H>  /  ALT  44  /  AlkPhos  36<L>  08-08    proBNP:   Lipid Profile:   HgA1c:   TSH:           TELEMETRY: 	    ECG:  NSR psot op day  1

## 2018-08-08 NOTE — PHYSICAL THERAPY INITIAL EVALUATION ADULT - LIVES WITH, PROFILE
Pt lives in a house with 10 steps to enter, 14 to bedroom, + railing. Pt was ambulatory without a device PTA/spouse

## 2018-08-08 NOTE — DIETITIAN INITIAL EVALUATION ADULT. - ENERGY NEEDS
Ht: 5'8", Wt: 180.1lbs, BMI: 27.3kg/m2, IBW: 154lbs(+/-10%), 116%IBW  Pertinent information: Pt admitted 2/2 abnormal stress test with chest pressure. S/p cardiac cath, revealed double vessel disease. Pt now POD #1 for CABG x4  No Edema documented, Skin intact

## 2018-08-08 NOTE — PROGRESS NOTE ADULT - ASSESSMENT
73M -pmh of HTN with complaints of SSCP  with LHC revealing MVD now s/p CABGx 4 ( ef40%) with Dr Tamayo on 8/7/18 8/7 tx to floor

## 2018-08-08 NOTE — DIETITIAN INITIAL EVALUATION ADULT. - ORAL INTAKE PTA
good/Pt reports a good PO intake PTA. Consumes 3 meals daily, and consumes foods from each food group.

## 2018-08-08 NOTE — PHYSICAL THERAPY INITIAL EVALUATION ADULT - BALANCE TRAINING, PT EVAL
GOAL: Pt's static/dynamic sitting/standing balance will improve to normal to increase stability, and decrease risk for falls when ambulating or performing ADL's I have reviewed medications, follow up provider options, and discharge instructions with the patient. The patient verbalized understanding. Copy of discharge information given to patient upon discharge. Prescription(s) given to patient. Patient discharged in no distress. Patient ambulatory to waiting area. No questions at this time.

## 2018-08-08 NOTE — DIETITIAN INITIAL EVALUATION ADULT. - OTHER INFO
Pt seen for LOS in CTU. Pt seen, report feeling well. Pt denies any recent weight changes. UBW is consistent with admission Wt. Pt currently with a 15lb weight increase, likely related to intraoperative fluid shifts. Increased nutrient  needs for post op wound healing reviewed with Pt. Nutrient denies meals and snacks reviewed. Pt denies need for heart healthy diet education at this time, however RD did encouraged a diet lower in Na and fat. Pt takes MVI PTA. Pt denies GI distress, chewing/swallowing difficulty. NKFA

## 2018-08-08 NOTE — PROGRESS NOTE ADULT - SUBJECTIVE AND OBJECTIVE BOX
Operation: C4L  POD # 1  Extubated, doing well     Vital Signs Last 24 Hrs  T(C): 36.6 (07 Aug 2018 23:00), Max: 36.6 (07 Aug 2018 18:00)  T(F): 97.9 (07 Aug 2018 23:00), Max: 97.9 (07 Aug 2018 18:00)  HR: 97 (08 Aug 2018 01:00) (64 - 104)  BP: 153/77 (07 Aug 2018 05:16) (153/77 - 153/77)  BP(mean): --  RR: 15 (08 Aug 2018 01:00) (12 - 28)  SpO2: 99% (08 Aug 2018 01:00) (98% - 100%)  I&O's Detail    06 Aug 2018 07:01  -  07 Aug 2018 07:00  --------------------------------------------------------  IN:    heparin  Infusion.: 106 mL    Oral Fluid: 500 mL  Total IN: 606 mL    OUT:  Total OUT: 0 mL    Total NET: 606 mL      07 Aug 2018 07:01  -  08 Aug 2018 01:47  --------------------------------------------------------  IN:    Albumin 5%  - 250 mL: 1000 mL    dexmedetomidine Infusion: 8 mL    DOBUTamine Infusion: 90.1 mL    insulin Infusion: 34 mL    IV PiggyBack: 700 mL    Lactated Ringers IV Bolus: 1000 mL    niCARdipine Infusion: 15 mL    norepinephrine Infusion: 25 mL    sodium chloride 0.9%: 400 mL    sodium chloride 0.9%.: 110 mL    Solution: 1350 mL  Total IN: 4732.1 mL    OUT:    Chest Tube: 100 mL    Chest Tube: 560 mL    Indwelling Catheter - Urethral: 5895 mL  Total OUT: 6555 mL    Total NET: -1822.9 mL      I&O's Summary    06 Aug 2018 07:01  -  07 Aug 2018 07:00  --------------------------------------------------------  IN: 606 mL / OUT: 0 mL / NET: 606 mL    07 Aug 2018 07:01  -  08 Aug 2018 01:47  --------------------------------------------------------  IN: 4732.1 mL / OUT: 6555 mL / NET: -1822.9 mL        LABS:  CBC Full  -  ( 08 Aug 2018 00:50 )  WBC Count : 11.6 K/uL  Hemoglobin : 8.9 g/dL  Hematocrit : 26.3 %  Platelet Count - Automated : 124 K/uL  Mean Cell Volume : 95.4 fl  Mean Cell Hemoglobin : 32.3 pg  Mean Cell Hemoglobin Concentration : 33.9 gm/dL  Auto Neutrophil # : 10.1 K/uL  Auto Lymphocyte # : 0.7 K/uL  Auto Monocyte # : 0.9 K/uL  Auto Eosinophil # : 0.0 K/uL  Auto Basophil # : 0.0 K/uL  Auto Neutrophil % : 86.3 %  Auto Lymphocyte % : 5.8 %  Auto Monocyte % : 7.6 %  Auto Eosinophil % : 0.1 %  Auto Basophil % : 0.1 %    08-08    134<L>  |  101  |  12  ----------------------------<  133<H>  4.2   |  22  |  0.90    Ca    10.3      08 Aug 2018 00:53  Phos  2.3     08-08  Mg     1.9     08-08    TPro  6.3  /  Alb  4.8  /  TBili  1.3<H>  /  DBili  x   /  AST  55<H>  /  ALT  44  /  AlkPhos  36<L>  08-08    PT/INR - ( 08 Aug 2018 00:50 )   PT: 13.8 sec;   INR: 1.27 ratio         PTT - ( 08 Aug 2018 00:50 )  PTT:41.5 sec    Daily Height in cm: 172.7 (07 Aug 2018 14:15)    Daily     MEDICATIONS  (STANDING):  aspirin enteric coated 81 milliGRAM(s) Oral daily  cefuroxime  IVPB 1500 milliGRAM(s) IV Intermittent every 8 hours  chlorhexidine 0.12% Liquid 5 milliLiter(s) Swish and Spit every 4 hours  dexmedetomidine Infusion 0.5 MICROgram(s)/kG/Hr (9.35 mL/Hr) IV Continuous <Continuous>  dextrose 50% Injectable 50 milliLiter(s) IV Push every 15 minutes  dextrose 50% Injectable 25 milliLiter(s) IV Push every 15 minutes  DOBUTamine Infusion 5 MICROgram(s)/kG/Min (11.22 mL/Hr) IV Continuous <Continuous>  docusate sodium 100 milliGRAM(s) Oral three times a day  famotidine Injectable 20 milliGRAM(s) IV Push every 12 hours  HYDROmorphone  Injectable 0.5 milliGRAM(s) IV Push once  insulin Infusion 2 Unit(s)/Hr (2 mL/Hr) IV Continuous <Continuous>  meperidine     Injectable 25 milliGRAM(s) IV Push once  multiple electrolytes Injection Type 1 1000 milliLiter(s) (400 mL/Hr) IV Continuous <Continuous>  niCARdipine Infusion 5 mG/Hr (25 mL/Hr) IV Continuous <Continuous>  norepinephrine Infusion 0.05 MICROgram(s)/kG/Min (7.013 mL/Hr) IV Continuous <Continuous>  polyethylene glycol 3350 17 Gram(s) Oral daily  potassium chloride  10 mEq/50 mL IVPB 10 milliEquivalent(s) IV Intermittent every 1 hour  potassium chloride  10 mEq/50 mL IVPB 10 milliEquivalent(s) IV Intermittent every 1 hour  potassium chloride  10 mEq/50 mL IVPB 10 milliEquivalent(s) IV Intermittent every 1 hour  sodium chloride 0.9% 500 milliLiter(s) (100 mL/Hr) IV Continuous <Continuous>  sodium chloride 0.9%. 1000 milliLiter(s) (10 mL/Hr) IV Continuous <Continuous>    MEDICATIONS  (PRN):  acetaminophen   Tablet. 650 milliGRAM(s) Oral every 6 hours PRN Mild Pain (1 - 3)  oxyCODONE    IR 5 milliGRAM(s) Oral every 6 hours PRN Moderate Pain (4 - 6)      General: A+Ox3, in NAD  Chest: sternal dressing C/D/I  Heart: S1, S2, RRR  Lungs: CTA B/L, without W/R/R  Abdomen: Soft, ND, NT, positive BS  Urology: Salazar  Extremeties: without edema B/L LE, positive DP pulses B/L   Meds/Lt pleural    Does the patient have a history of CHF: No  -If yes, systolic or diastolic:  -pre-operative EF: 45    Extubation within 24 hours: Yes    Does the patient have a history of kidney disease: No  -give CKD stage if applicable: Stage 2  -what is patient's baseline Creatinine: 1.20    Beta Blockers contraindicated for the first 24 hours due to vasopressor/inotrpic medication: Yes  -If on pressors, indication:    Did the patient receive transfusion of blood and/or products: No  -If yes, indication:    DVT PPX: Venodynes    Gin-operative antibiotics discontinued within 48 hours of CTU admission:  Yariel 8/9/18 1:20    Assessment/Plan: 72 y/o M with PMHx of HTN s/p C4L POD # 1. Extubated, stable.     GI / DVT prophylaxis  keep K>4, mag >2.0   -wean pressors   - asa, statin   - glycemic control   -cont post op ABX   -d/c chest tube  - pain management.         Patient care discussed on interdisciplinary rounds with CTS team.

## 2018-08-08 NOTE — DIETITIAN INITIAL EVALUATION ADULT. - NS AS NUTRI INTERV FEED ASSISTANCE
1. Provide food preferences as requested by Pt/family within diet restrictions  2. Encourage PO intake during meal times 3. Reviewed menu ordering procedures

## 2018-08-08 NOTE — PROGRESS NOTE ADULT - PROBLEM SELECTOR PLAN 1
c/w asa.beta blocker on lopressor 25mg po bid/ statin for CAD  maintain  tubes today  monitor output  maintain pw  daily weights  DVT and GI ppx  oral analgesics for post op pain   am CXR

## 2018-08-08 NOTE — DIETITIAN INITIAL EVALUATION ADULT. - PERTINENT LABORATORY DATA
Na: 134, Glu: 133, Alk Phos: 36, Ast: 55, Total Bilirubin: 1.3, Hgba1c: 6.0%, Bg levels: 8/8: 111-129, 8/7: 134-185

## 2018-08-08 NOTE — PROGRESS NOTE ADULT - ASSESSMENT
1. post day 1 CABG  2. hemodynamically stable  3. NSR    Recommend  incentive spirometry  proceed as per CT surgery

## 2018-08-08 NOTE — PHYSICAL THERAPY INITIAL EVALUATION ADULT - PLANNED THERAPY INTERVENTIONS, PT EVAL
gait training/strengthening/balance training/bed mobility training/Pt will negotiate up/down 14  steps independently with appropriate assistive device and railing in 4 weeks/transfer training

## 2018-08-08 NOTE — PROGRESS NOTE ADULT - SUBJECTIVE AND OBJECTIVE BOX
MINDI COOPER   MRN#: [id number]     The patient is a 73y Male who was seen, evaluated, & examined with the CTICU staff on rounds and later in the day with a multidisciplinary care plan formulated & implemented.  All available clinical, laboratory, radiographic, pharmacologic, and electrocardiographic data were reviewed & analyzed.      The patient was in the CTICU in critical condition secondary to acute hypoxic respiratory failure-persistent cardiopulmonary dysfunction, resolved hemodynamically significant hypovolemia-shock, and resolved hyperlactatemia-acidosis S/P C3L.      Respiratory failure required full ventilatory support, the following of ABG’s with A-line monitoring, and continuous pulse oximetry monitoring for support & to evaluate for & prevent further decompensation secondary to persistent cardiopulmonary dysfunction.     Invasive hemodynamic monitoring with an A-line was required for the following of  continuous MAP/BP monitoring to ensure adequate cardiovascular support and to evaluate for & help prevent decompensation while receiving intermittent volume expansion and cessation of the an IV Dobutamine drip secondary to resolved hemodynamically significant hypovolemia-shock and hyperlactatemia-acidosis.       Patient required critical care management and I provided 30 minutes of non-continuous care to the patient.  Discussed at length with the CTICU staff and helped coordinate care.

## 2018-08-09 LAB
ANION GAP SERPL CALC-SCNC: 10 MMOL/L — SIGNIFICANT CHANGE UP (ref 5–17)
BUN SERPL-MCNC: 24 MG/DL — HIGH (ref 7–23)
CALCIUM SERPL-MCNC: 9.1 MG/DL — SIGNIFICANT CHANGE UP (ref 8.4–10.5)
CHLORIDE SERPL-SCNC: 102 MMOL/L — SIGNIFICANT CHANGE UP (ref 96–108)
CO2 SERPL-SCNC: 24 MMOL/L — SIGNIFICANT CHANGE UP (ref 22–31)
CREAT SERPL-MCNC: 1.13 MG/DL — SIGNIFICANT CHANGE UP (ref 0.5–1.3)
GLUCOSE BLDC GLUCOMTR-MCNC: 145 MG/DL — HIGH (ref 70–99)
GLUCOSE BLDC GLUCOMTR-MCNC: 149 MG/DL — HIGH (ref 70–99)
GLUCOSE BLDC GLUCOMTR-MCNC: 156 MG/DL — HIGH (ref 70–99)
GLUCOSE BLDC GLUCOMTR-MCNC: 232 MG/DL — HIGH (ref 70–99)
GLUCOSE SERPL-MCNC: 184 MG/DL — HIGH (ref 70–99)
HCT VFR BLD CALC: 28.9 % — LOW (ref 39–50)
HGB BLD-MCNC: 9.7 G/DL — LOW (ref 13–17)
MCHC RBC-ENTMCNC: 32.3 PG — SIGNIFICANT CHANGE UP (ref 27–34)
MCHC RBC-ENTMCNC: 33.5 GM/DL — SIGNIFICANT CHANGE UP (ref 32–36)
MCV RBC AUTO: 96.5 FL — SIGNIFICANT CHANGE UP (ref 80–100)
PLATELET # BLD AUTO: 129 K/UL — LOW (ref 150–400)
POTASSIUM SERPL-MCNC: 5 MMOL/L — SIGNIFICANT CHANGE UP (ref 3.5–5.3)
POTASSIUM SERPL-SCNC: 5 MMOL/L — SIGNIFICANT CHANGE UP (ref 3.5–5.3)
RBC # BLD: 2.99 M/UL — LOW (ref 4.2–5.8)
RBC # FLD: 12.6 % — SIGNIFICANT CHANGE UP (ref 10.3–14.5)
SODIUM SERPL-SCNC: 136 MMOL/L — SIGNIFICANT CHANGE UP (ref 135–145)
WBC # BLD: 19.5 K/UL — HIGH (ref 3.8–10.5)
WBC # FLD AUTO: 19.5 K/UL — HIGH (ref 3.8–10.5)

## 2018-08-09 PROCEDURE — 71045 X-RAY EXAM CHEST 1 VIEW: CPT | Mod: 26

## 2018-08-09 RX ORDER — SORBITOL SOLUTION 70 %
15 SOLUTION, ORAL MISCELLANEOUS ONCE
Qty: 0 | Refills: 0 | Status: COMPLETED | OUTPATIENT
Start: 2018-08-09 | End: 2018-08-09

## 2018-08-09 RX ADMIN — PANTOPRAZOLE SODIUM 40 MILLIGRAM(S): 20 TABLET, DELAYED RELEASE ORAL at 06:59

## 2018-08-09 RX ADMIN — Medication 100 MILLIGRAM(S): at 11:31

## 2018-08-09 RX ADMIN — Medication 25 MILLIGRAM(S): at 06:59

## 2018-08-09 RX ADMIN — ATORVASTATIN CALCIUM 40 MILLIGRAM(S): 80 TABLET, FILM COATED ORAL at 22:45

## 2018-08-09 RX ADMIN — Medication 650 MILLIGRAM(S): at 12:00

## 2018-08-09 RX ADMIN — OXYCODONE HYDROCHLORIDE 5 MILLIGRAM(S): 5 TABLET ORAL at 23:20

## 2018-08-09 RX ADMIN — Medication 4: at 08:52

## 2018-08-09 RX ADMIN — CHLORHEXIDINE GLUCONATE 5 MILLILITER(S): 213 SOLUTION TOPICAL at 22:45

## 2018-08-09 RX ADMIN — CHLORHEXIDINE GLUCONATE 5 MILLILITER(S): 213 SOLUTION TOPICAL at 02:02

## 2018-08-09 RX ADMIN — Medication 650 MILLIGRAM(S): at 11:29

## 2018-08-09 RX ADMIN — OXYCODONE HYDROCHLORIDE 5 MILLIGRAM(S): 5 TABLET ORAL at 16:34

## 2018-08-09 RX ADMIN — CHLORHEXIDINE GLUCONATE 5 MILLILITER(S): 213 SOLUTION TOPICAL at 07:01

## 2018-08-09 RX ADMIN — Medication 100 MILLIGRAM(S): at 02:02

## 2018-08-09 RX ADMIN — OXYCODONE HYDROCHLORIDE 5 MILLIGRAM(S): 5 TABLET ORAL at 22:46

## 2018-08-09 RX ADMIN — OXYCODONE HYDROCHLORIDE 5 MILLIGRAM(S): 5 TABLET ORAL at 16:04

## 2018-08-09 RX ADMIN — Medication 25 MILLIGRAM(S): at 18:07

## 2018-08-09 RX ADMIN — Medication 15 MILLILITER(S): at 11:28

## 2018-08-09 RX ADMIN — Medication 325 MILLIGRAM(S): at 11:29

## 2018-08-09 RX ADMIN — POLYETHYLENE GLYCOL 3350 17 GRAM(S): 17 POWDER, FOR SOLUTION ORAL at 18:07

## 2018-08-09 RX ADMIN — ENOXAPARIN SODIUM 40 MILLIGRAM(S): 100 INJECTION SUBCUTANEOUS at 11:29

## 2018-08-09 NOTE — PROGRESS NOTE ADULT - SUBJECTIVE AND OBJECTIVE BOX
Subjective: Patient seen and examined. No new events except as noted.   postop day 2 status post coronary bypass surgery 4  Patient feels well, denies shortness of breath  but is complaining of chest wall pain. He has some pain on taking a deep breath  Chest tube remains in place and is to be removed probably today  He remains in normal sinus rhythm  MEDICATIONS:  MEDICATIONS  (STANDING):  aspirin enteric coated 325 milliGRAM(s) Oral daily  atorvastatin 40 milliGRAM(s) Oral at bedtime  chlorhexidine 0.12% Liquid 5 milliLiter(s) Swish and Spit every 4 hours  dextrose 5%. 1000 milliLiter(s) (50 mL/Hr) IV Continuous <Continuous>  dextrose 50% Injectable 12.5 Gram(s) IV Push once  dextrose 50% Injectable 25 Gram(s) IV Push once  dextrose 50% Injectable 25 Gram(s) IV Push once  dextrose 50% Injectable 50 milliLiter(s) IV Push every 15 minutes  dextrose 50% Injectable 25 milliLiter(s) IV Push every 15 minutes  docusate sodium 100 milliGRAM(s) Oral three times a day  enoxaparin Injectable 40 milliGRAM(s) SubCutaneous daily  insulin lispro (HumaLOG) corrective regimen sliding scale   SubCutaneous three times a day before meals  insulin lispro (HumaLOG) corrective regimen sliding scale   SubCutaneous at bedtime  metoprolol tartrate 25 milliGRAM(s) Oral every 12 hours  pantoprazole    Tablet 40 milliGRAM(s) Oral before breakfast  polyethylene glycol 3350 17 Gram(s) Oral daily  potassium chloride  10 mEq/50 mL IVPB 10 milliEquivalent(s) IV Intermittent every 1 hour  potassium chloride  10 mEq/50 mL IVPB 10 milliEquivalent(s) IV Intermittent every 1 hour  potassium chloride  10 mEq/50 mL IVPB 10 milliEquivalent(s) IV Intermittent every 1 hour  sodium chloride 0.9%. 1000 milliLiter(s) (10 mL/Hr) IV Continuous <Continuous>      PHYSICAL EXAM:  T(C): 36.9 (08-09-18 @ 05:35), Max: 37 (08-08-18 @ 20:52)  HR: 81 (08-09-18 @ 05:35) (76 - 81)  BP: 143/82 (08-09-18 @ 05:35) (112/71 - 143/82)  RR: 18 (08-09-18 @ 05:35) (16 - 18)  SpO2: 93% (08-09-18 @ 05:35) (93% - 97%)  Wt(kg): --  I&O's Summary    08 Aug 2018 07:01  -  09 Aug 2018 07:00  --------------------------------------------------------  IN: 1041.3 mL / OUT: 1435 mL / NET: -393.7 mL    09 Aug 2018 07:01  -  09 Aug 2018 12:49  --------------------------------------------------------  IN: 240 mL / OUT: 700 mL / NET: -460 mL          Appearance: Normal looks well	  HEENT:   Normal oral mucosa, PERRL, EOMI	  Cardiovascular: Normal S1 S2, No JVD, No murmurs ,no rub  Respiratory: Lungs clear to auscultation, normal effort sternum appears to be healing  stable	  Gastrointestinal:  Soft, Non-tender, + BS	  Skin: No rashes, No ecchymoses, No cyanosis, warm to touch  Psychiatry:  Mood & affect appropriate  Ext: No edema  Peripheral pulses palpable 2+ bilaterally      LABS:    CARDIAC MARKERS:  CARDIAC MARKERS ( 08 Aug 2018 00:53 )  x     / x     / 323 U/L / x     / 14.8 ng/mL  CARDIAC MARKERS ( 07 Aug 2018 14:58 )  x     / x     / 278 U/L / x     / 18.9 ng/mL                                9.7    19.5  )-----------( 129      ( 09 Aug 2018 04:42 )             28.9     08-09    136  |  102  |  24<H>  ----------------------------<  184<H>  5.0   |  24  |  1.13    Ca    9.1      09 Aug 2018 04:42  Phos  2.3     08-08  Mg     1.9     08-08    TPro  6.3  /  Alb  4.8  /  TBili  1.3<H>  /  DBili  x   /  AST  55<H>  /  ALT  44  /  AlkPhos  36<L>  08-08    proBNP:   Lipid Profile:   HgA1c:   TSH:           TELEMETRY: 	    ECG:normal sinus rhythm

## 2018-08-09 NOTE — PROGRESS NOTE ADULT - ASSESSMENT
1. post day 2 CABG X4  2. hemodynamically stable  3. NSR    Recommend  incentive spirometry  remove chest tube as per CT surgery  Discharge planning  Continue metoprolol.

## 2018-08-09 NOTE — PROGRESS NOTE ADULT - SUBJECTIVE AND OBJECTIVE BOX
VITAL SIGNS    Telemetry:      Vital Signs Last 24 Hrs  T(C): 36.9 (18 @ 05:35), Max: 37.5 (18 @ 11:00)  T(F): 98.4 (18 @ 05:35), Max: 99.5 (18 @ 11:00)  HR: 81 (18 @ 05:35) (70 - 81)  BP: 143/82 (18 @ 05:35) (112/71 - 143/82)  RR: 18 (18 @ 05:35) (16 - 32)  SpO2: 93% (18 @ 05:35) (93% - 100%)                   Daily     Daily Weight in k (09 Aug 2018 08:00)        CAPILLARY BLOOD GLUCOSE      POCT Blood Glucose.: 232 mg/dL (09 Aug 2018 08:51)  POCT Blood Glucose.: 230 mg/dL (08 Aug 2018 21:38)  POCT Blood Glucose.: 230 mg/dL (08 Aug 2018 16:35)  POCT Blood Glucose.: 158 mg/dL (08 Aug 2018 13:07)          Drains:     MedS       Pacing Wires                           PHYSICAL EXAM    Neurology: alert and oriented x 3, moves all extremities with no defecits  CV :  RRR  Sternal Wound :  CDI , Stable  Lungs:   CTA B/L  Abdomen: soft, nontender, nondistended, positive bowel sounds, last bowel movement   Extremities: VITAL SIGNS    Telemetry:  sr  80    Vital Signs Last 24 Hrs  T(C): 36.9 (18 @ 05:35), Max: 37.5 (18 @ 11:00)  T(F): 98.4 (18 @ 05:35), Max: 99.5 (18 @ 11:00)  HR: 81 (18 @ 05:35) (70 - 81)  BP: 143/82 (18 @ 05:35) (112/71 - 143/82)  RR: 18 (18 @ 05:35) (16 - 32)  SpO2: 93% (18 @ 05:35) (93% - 100%)             Daily Weight in k (09 Aug 2018 08:00)        CAPILLARY BLOOD GLUCOSE      POCT Blood Glucose.: 232 mg/dL (09 Aug 2018 08:51)  POCT Blood Glucose.: 230 mg/dL (08 Aug 2018 21:38)  POCT Blood Glucose.: 230 mg/dL (08 Aug 2018 16:35)  POCT Blood Glucose.: 158 mg/dL (08 Aug 2018 13:07)          Drains:     MedS       Pacing Wires                           PHYSICAL EXAM    Neurology: alert and oriented x 3, moves all extremities with no defecits  CV :  RRR  Sternal Wound :  CDI , Stable   with pw  Lungs:   CTA B/L  Abdomen: soft, nontender, nondistended, positive bowel sounds, last bowel movement    preop  Extremities:     trace   le edema   no calf tenderness

## 2018-08-09 NOTE — PROGRESS NOTE ADULT - PROBLEM SELECTOR PLAN 1
c/w asa.beta blocker on lopressor 25mg po bid/ statin for CAD  maintain    meds tubes today  monitor output  maintain pw c/w asa.beta blocker on lopressor 25mg po bid/ statin for CAD  maintain    meds tubes today  monitor output   isolate  pw  maintain pw

## 2018-08-09 NOTE — PROGRESS NOTE ADULT - ASSESSMENT
73M -pmh of HTN with complaints of SSCP  with LHC revealing MVD now s/p CABGx 4 ( ef40%) with Dr Tamayo on 8/7/18 8/7 tx to floor   8/9   lt pleural chest tube removed,   ambulating 73M -pmh of HTN with complaints of SSCP  with LHC revealing MVD now s/p CABGx 4 ( ef40%) with Dr Tamayo on 8/7/18 8/7 tx to floor   8/9   lt pleural chest tube removed,   ambulating,  isolate  pw

## 2018-08-10 ENCOUNTER — TRANSCRIPTION ENCOUNTER (OUTPATIENT)
Age: 73
End: 2018-08-10

## 2018-08-10 LAB
ANION GAP SERPL CALC-SCNC: 8 MMOL/L — SIGNIFICANT CHANGE UP (ref 5–17)
BASOPHILS # BLD AUTO: 0 K/UL — SIGNIFICANT CHANGE UP (ref 0–0.2)
BASOPHILS NFR BLD AUTO: 0.2 % — SIGNIFICANT CHANGE UP (ref 0–2)
BUN SERPL-MCNC: 21 MG/DL — SIGNIFICANT CHANGE UP (ref 7–23)
CALCIUM SERPL-MCNC: 8.8 MG/DL — SIGNIFICANT CHANGE UP (ref 8.4–10.5)
CHLORIDE SERPL-SCNC: 101 MMOL/L — SIGNIFICANT CHANGE UP (ref 96–108)
CO2 SERPL-SCNC: 29 MMOL/L — SIGNIFICANT CHANGE UP (ref 22–31)
CREAT SERPL-MCNC: 1.12 MG/DL — SIGNIFICANT CHANGE UP (ref 0.5–1.3)
EOSINOPHIL # BLD AUTO: 0.1 K/UL — SIGNIFICANT CHANGE UP (ref 0–0.5)
EOSINOPHIL NFR BLD AUTO: 0.5 % — SIGNIFICANT CHANGE UP (ref 0–6)
GLUCOSE BLDC GLUCOMTR-MCNC: 112 MG/DL — HIGH (ref 70–99)
GLUCOSE BLDC GLUCOMTR-MCNC: 125 MG/DL — HIGH (ref 70–99)
GLUCOSE BLDC GLUCOMTR-MCNC: 139 MG/DL — HIGH (ref 70–99)
GLUCOSE BLDC GLUCOMTR-MCNC: 149 MG/DL — HIGH (ref 70–99)
GLUCOSE SERPL-MCNC: 118 MG/DL — HIGH (ref 70–99)
HCT VFR BLD CALC: 31.1 % — LOW (ref 39–50)
HGB BLD-MCNC: 10.1 G/DL — LOW (ref 13–17)
LYMPHOCYTES # BLD AUTO: 19.5 % — SIGNIFICANT CHANGE UP (ref 13–44)
LYMPHOCYTES # BLD AUTO: 2.7 K/UL — SIGNIFICANT CHANGE UP (ref 1–3.3)
MCHC RBC-ENTMCNC: 31.5 PG — SIGNIFICANT CHANGE UP (ref 27–34)
MCHC RBC-ENTMCNC: 32.4 GM/DL — SIGNIFICANT CHANGE UP (ref 32–36)
MCV RBC AUTO: 97.1 FL — SIGNIFICANT CHANGE UP (ref 80–100)
MONOCYTES # BLD AUTO: 1.8 K/UL — HIGH (ref 0–0.9)
MONOCYTES NFR BLD AUTO: 13.3 % — SIGNIFICANT CHANGE UP (ref 2–14)
NEUTROPHILS # BLD AUTO: 9.2 K/UL — HIGH (ref 1.8–7.4)
NEUTROPHILS NFR BLD AUTO: 66.5 % — SIGNIFICANT CHANGE UP (ref 43–77)
PLATELET # BLD AUTO: 133 K/UL — LOW (ref 150–400)
POTASSIUM SERPL-MCNC: 4.4 MMOL/L — SIGNIFICANT CHANGE UP (ref 3.5–5.3)
POTASSIUM SERPL-SCNC: 4.4 MMOL/L — SIGNIFICANT CHANGE UP (ref 3.5–5.3)
RBC # BLD: 3.2 M/UL — LOW (ref 4.2–5.8)
RBC # FLD: 12.6 % — SIGNIFICANT CHANGE UP (ref 10.3–14.5)
SODIUM SERPL-SCNC: 138 MMOL/L — SIGNIFICANT CHANGE UP (ref 135–145)
WBC # BLD: 13.9 K/UL — HIGH (ref 3.8–10.5)
WBC # FLD AUTO: 13.9 K/UL — HIGH (ref 3.8–10.5)

## 2018-08-10 PROCEDURE — 93010 ELECTROCARDIOGRAM REPORT: CPT

## 2018-08-10 PROCEDURE — 71045 X-RAY EXAM CHEST 1 VIEW: CPT | Mod: 26

## 2018-08-10 RX ORDER — METOPROLOL TARTRATE 50 MG
25 TABLET ORAL EVERY 8 HOURS
Qty: 0 | Refills: 0 | Status: DISCONTINUED | OUTPATIENT
Start: 2018-08-10 | End: 2018-08-13

## 2018-08-10 RX ORDER — METOPROLOL TARTRATE 50 MG
2.5 TABLET ORAL ONCE
Qty: 0 | Refills: 0 | Status: COMPLETED | OUTPATIENT
Start: 2018-08-10 | End: 2018-08-10

## 2018-08-10 RX ORDER — METOPROLOL TARTRATE 50 MG
5 TABLET ORAL ONCE
Qty: 0 | Refills: 0 | Status: COMPLETED | OUTPATIENT
Start: 2018-08-10 | End: 2018-08-11

## 2018-08-10 RX ORDER — AMIODARONE HYDROCHLORIDE 400 MG/1
400 TABLET ORAL EVERY 8 HOURS
Qty: 0 | Refills: 0 | Status: COMPLETED | OUTPATIENT
Start: 2018-08-10 | End: 2018-08-14

## 2018-08-10 RX ORDER — AMIODARONE HYDROCHLORIDE 400 MG/1
200 TABLET ORAL DAILY
Qty: 0 | Refills: 0 | Status: DISCONTINUED | OUTPATIENT
Start: 2018-08-14 | End: 2018-08-15

## 2018-08-10 RX ORDER — METOPROLOL TARTRATE 50 MG
5 TABLET ORAL ONCE
Qty: 0 | Refills: 0 | Status: COMPLETED | OUTPATIENT
Start: 2018-08-10 | End: 2018-08-10

## 2018-08-10 RX ADMIN — AMIODARONE HYDROCHLORIDE 400 MILLIGRAM(S): 400 TABLET ORAL at 21:31

## 2018-08-10 RX ADMIN — OXYCODONE HYDROCHLORIDE 5 MILLIGRAM(S): 5 TABLET ORAL at 21:30

## 2018-08-10 RX ADMIN — CHLORHEXIDINE GLUCONATE 5 MILLILITER(S): 213 SOLUTION TOPICAL at 21:30

## 2018-08-10 RX ADMIN — Medication 25 MILLIGRAM(S): at 05:48

## 2018-08-10 RX ADMIN — Medication 650 MILLIGRAM(S): at 15:17

## 2018-08-10 RX ADMIN — ENOXAPARIN SODIUM 40 MILLIGRAM(S): 100 INJECTION SUBCUTANEOUS at 11:36

## 2018-08-10 RX ADMIN — Medication 5 MILLIGRAM(S): at 18:35

## 2018-08-10 RX ADMIN — CHLORHEXIDINE GLUCONATE 5 MILLILITER(S): 213 SOLUTION TOPICAL at 17:17

## 2018-08-10 RX ADMIN — Medication 100 MILLIGRAM(S): at 21:31

## 2018-08-10 RX ADMIN — Medication 2.5 MILLIGRAM(S): at 19:05

## 2018-08-10 RX ADMIN — Medication 650 MILLIGRAM(S): at 10:00

## 2018-08-10 RX ADMIN — CHLORHEXIDINE GLUCONATE 5 MILLILITER(S): 213 SOLUTION TOPICAL at 05:49

## 2018-08-10 RX ADMIN — OXYCODONE HYDROCHLORIDE 5 MILLIGRAM(S): 5 TABLET ORAL at 22:17

## 2018-08-10 RX ADMIN — Medication 650 MILLIGRAM(S): at 14:34

## 2018-08-10 RX ADMIN — Medication 25 MILLIGRAM(S): at 13:16

## 2018-08-10 RX ADMIN — Medication 650 MILLIGRAM(S): at 09:15

## 2018-08-10 RX ADMIN — PANTOPRAZOLE SODIUM 40 MILLIGRAM(S): 20 TABLET, DELAYED RELEASE ORAL at 05:48

## 2018-08-10 RX ADMIN — ATORVASTATIN CALCIUM 40 MILLIGRAM(S): 80 TABLET, FILM COATED ORAL at 21:31

## 2018-08-10 RX ADMIN — Medication 325 MILLIGRAM(S): at 11:36

## 2018-08-10 RX ADMIN — Medication 25 MILLIGRAM(S): at 21:31

## 2018-08-10 RX ADMIN — Medication 100 MILLIGRAM(S): at 05:48

## 2018-08-10 NOTE — DISCHARGE NOTE ADULT - NS AS ACTIVITY OBS
Stairs allowed/Showering allowed/Walking-Indoors allowed/Do not make important decisions/Do not drive or operate machinery/No Heavy lifting/straining/Walking-Outdoors allowed

## 2018-08-10 NOTE — DISCHARGE NOTE ADULT - ADDITIONAL INSTRUCTIONS
1. Follow up with Dr. Tamayo on Wednesday 9/5/18 at 1:15 pm for your post op follow up appointment, please call the [facility] office to confirm your appointment at [phone].   2. Please schedule an appointment with your Cardiologist Dr. Haney in 1-2 weeks, please call the office to schedule an appointment.   3. Please schedule an appointment with your PCP Dr. Carrillo  in 1-2 weeks, call the office to schedule an appointment.

## 2018-08-10 NOTE — DISCHARGE NOTE ADULT - OTHER SIGNIFICANT FINDINGS
General: WN/WD NAD  Neurology: A&Ox3, nonfocal, ESPINOZA x 4  Head:  Normocephalic, atraumatic  ENT:  Mucosa moist, no ulcerations  Neck:  Supple, no sinuses or palpable masses  Lymphatic:  No palpable cervical, supraclavicular, axillary or inguinal adenopathy  Respiratory: CTA B/L  CV: RRR, S1S2, no murmur  Abdominal: Soft, NT, ND no palpable mass  MSK: No edema, + peripheral pulses, FROM all 4 extremity  Incisions: intact, no erythema or drainage  Spent more than 30 min with patient.

## 2018-08-10 NOTE — PROGRESS NOTE ADULT - SUBJECTIVE AND OBJECTIVE BOX
VITAL SIGNS    Telemetry:    sr  70-90    Vital Signs Last 24 Hrs  T(C): 36.8 (08-10-18 @ 14:02), Max: 36.9 (18 @ 20:27)  T(F): 98.3 (08-10-18 @ 14:02), Max: 98.5 (18 @ 20:27)  HR: 101 (08-10-18 @ 14:02) (74 - 101)  BP: 117/68 (08-10-18 @ 14:02) (110/69 - 137/77)  RR: 18 (08-10-18 @ 14:02) (18 - 18)  SpO2: 94% (08-10-18 @ 14:02) (90% - 96%)             Daily Weight in k.7 (10 Aug 2018 09:25)        CAPILLARY BLOOD GLUCOSE      POCT Blood Glucose.: 139 mg/dL (10 Aug 2018 12:12)  POCT Blood Glucose.: 125 mg/dL (10 Aug 2018 07:28)  POCT Blood Glucose.: 149 mg/dL (09 Aug 2018 21:34)  POCT Blood Glucose.: 156 mg/dL (09 Aug 2018 16:52)            Pacing Wires                                   Isolated                  PHYSICAL EXAM    Neurology: alert and oriented x 3, moves all extremities with no defecits  CV :  RRR  Sternal Wound :  CDI , Stable  with pw  Lungs:   CTA B/L  Abdomen: soft, nontender, nondistended, positive bowel sounds  Extremities:     trace  b/l  le edema   no calf tenderness

## 2018-08-10 NOTE — DISCHARGE NOTE ADULT - HOSPITAL COURSE
73M-PMHx of HTN with complaints of SSCP s/p LHC revealing MVD now s/p 8/7/18 CABG x 4 (EF 40%) with Dr. Tamayo   Post op Course:   8/7 Transition to SDU    8/9 Pleural chest tube removed, ambulating, isolate --> PW  8/10 MS CT draining;  ambulating; increased Lopressor to 25mg PO every 8 hours for PAT  8/11 NSR -->brief afib overnight; started on amio load last evening, ambulating; chest tube removed  8/12 PW d/c'd, pt converted to NSR 80s this AM, had 5h of Afib overnight rate 110-130s, continued on amio load  8/13 VVS; Overnight Afib 90-120s' --> AC therapy heparin gtt w/ Coumadin 5 mg PO tonight initiated. Converted to NSR this AM. Lopressor changed to Toprol 100 mg PO daily.   8/14/18 SR overnight 60-70 w/ 3 seconds afib @ 130- will continue Toprol 100 & amio load - Coumadin 5 tonight & heparin gtt.  Will d/c home Wednesday 8/15 if medically cleared.   8/15 VVS; Continue with PAF overnight –> Now NSR. Will transition to Eliquis 5 mg PO BID for AC therapy.  D/C Heparin gtt / D/C Coumadin.  Discharge home today.

## 2018-08-10 NOTE — PROGRESS NOTE ADULT - ASSESSMENT
73M -pmh of HTN with complaints of SSCP  with LHC revealing MVD now s/p CABGx 4 ( ef40%) with Dr Tamayo on 8/7/18 8/7 tx to floor   8/9   lt pleural chest tube removed,   ambulating,  isolate  pw  8/10     meds   draining,     ambulating    increased Lop to 25 q 8   for PAT

## 2018-08-10 NOTE — DISCHARGE NOTE ADULT - CARE PROVIDERS DIRECT ADDRESSES
[email or web address],[email or web address],DirectAddress_Unknown

## 2018-08-10 NOTE — DISCHARGE NOTE ADULT - MEDICATION SUMMARY - MEDICATIONS TO STOP TAKING
I will STOP taking the medications listed below when I get home from the hospital:    Teveten 600 mg oral tablet  -- 1 tab(s) by mouth once a day    Norvasc 2.5 mg oral tablet  -- 1 tab(s) by mouth once a day

## 2018-08-10 NOTE — DISCHARGE NOTE ADULT - PATIENT PORTAL LINK FT
You can access the [facility] Patient Portal, offered by [facility], by registering with the following website: [email or web address]

## 2018-08-10 NOTE — DISCHARGE NOTE ADULT - CARE PROVIDER_API CALL
Silvio Tamayo), Surgery; Thoracic and Cardiac Surgery  [address] [place] [address]  Phone: [phone]  Fax: [phone]    Rian Haney), Cardiovascular Disease; Internal Medicine; Interventional Cardiology  [address]  [place] [address]  Phone: [phone]  Fax: [phone]    Porter Carrillo), Internal Medicine  [address], [place] [address]  Phone: [phone]  Fax: [phone]

## 2018-08-10 NOTE — CHART NOTE - NSCHARTNOTEFT_GEN_A_CORE
Subjective:   Events overnight:  CC:    VITAL SIGNS  T(F): 98.8  HR: 115  BP: 111/69  RR: 18  SpO2: 98%    08-09-18 @ 07:01  -  08-10-18 @ 07:00  --------------------------------------------------------  OUT: 1050 mL / NET: -1050 mL    08-10-18 @ 07:01  -  08-10-18 @ 23:33  --------------------------------------------------------  OUT: 900 mL / NET: -900 mL        LAB                        10.1   13.9  )-----------( 133      ( 10 Aug 2018 06:36 )             31.1   138  |  101  |  21  ----------------------------<  118<H>  4.4   |  29  |  1.12    CAPILLARY BLOOD GLUCOSE    DIAGNOSTICS  Xray Chest 1 View- PORTABLE-Routine (08.10.18 @ 06:34)  Small left apical pneumothorax is unchanged from prior study. Left lung   base atelectasis. No pleural effusion. Cardiac silhouette is enlarged.   Sternotomy wires.  IMPRESSION:  Small left apical pneumothorax is unchanged from prior study.    MEDICATIONS  amiodarone    Tablet 400 milliGRAM(s) Oral every 8 hours  aspirin enteric coated 325 milliGRAM(s) Oral daily  atorvastatin 40 milliGRAM(s) Oral at bedtime  chlorhexidine 0.12% Liquid 5 milliLiter(s) Swish and Spit every 4 hours  docusate sodium 100 milliGRAM(s) Oral three times a day  enoxaparin Injectable 40 milliGRAM(s) SubCutaneous daily  insulin lispro (HumaLOG) corrective regimen sliding scale   SubCutaneous three times a day before meals  insulin lispro (HumaLOG) corrective regimen sliding scale   SubCutaneous at bedtime  metoprolol tartrate 25 milliGRAM(s) Oral every 8 hours  metoprolol tartrate Injectable 5 milliGRAM(s) IV Push once  pantoprazole    Tablet 40 milliGRAM(s) Oral before breakfast  polyethylene glycol 3350 17 Gram(s) Oral daily  potassium chloride  10 mEq/50 mL IVPB 10 milliEquivalent(s) IV Intermittent every 1 hour  potassium chloride  10 mEq/50 mL IVPB 10 milliEquivalent(s) IV Intermittent every 1 hour  potassium chloride  10 mEq/50 mL IVPB 10 milliEquivalent(s) IV Intermittent every 1 hour  sodium chloride 0.9%. 1000 milliLiter(s) IV Continuous <Continuous>      PHYSICAL EXAM  GEN: No apparent distress, examined in   PSYCH: Appropriate, communicative, A+Ox3  NEURO: Non-focal,  A+Ox 3  CV: S1 S2 without rub or murmur  MEDIASTINUM: Sternal incision covered with dressing, CDI, stable  PACING WIRES:   VVI [  ]  setting:      Isolated/Insulated [  ]  DRAINS:  Delta [  ]  Drainage:         Pleural [  ]  Drainage:    PULMONARY:  CTA, Decreased left base   INCENTIVE SPIROMETRY:  ABDOMEN:  Soft, non-tender, non-distended, bowel sounds active x 4  LBM:  : voiding   VASCULAR: +pp +radial  SKIN: Warm, dry, intact   leg incision:  ACE [  ]  MUSCULOSKELETAL:  Moves all extremities equally  PHYSICAL THERAPY REC:  Home [  ]  Home w PT [   ]   MONET [   ] Subjective:   Events overnight:  CC:    VITAL SIGNS  T(F): 98.8  HR: 115  BP: 111/69  RR: 18  SpO2: 98%    08-09-18 @ 07:01  -  08-10-18 @ 07:00  --------------------------------------------------------  OUT: 1050 mL / NET: -1050 mL    08-10-18 @ 07:01  -  08-10-18 @ 23:33  --------------------------------------------------------  OUT: 900 mL / NET: -900 mL        LAB                        10.1   13.9  )-----------( 133      ( 10 Aug 2018 06:36 )             31.1   138  |  101  |  21  ----------------------------<  118<H>  4.4   |  29  |  1.12    CAPILLARY BLOOD GLUCOSE    DIAGNOSTICS  Xray Chest 1 View- PORTABLE-Routine (08.10.18 @ 06:34)  Small left apical pneumothorax is unchanged from prior study. Left lung   base atelectasis. No pleural effusion. Cardiac silhouette is enlarged.   Sternotomy wires.  IMPRESSION:  Small left apical pneumothorax is unchanged from prior study.    MEDICATIONS  amiodarone    Tablet 400 milliGRAM(s) Oral every 8 hours  aspirin enteric coated 325 milliGRAM(s) Oral daily  atorvastatin 40 milliGRAM(s) Oral at bedtime  chlorhexidine 0.12% Liquid 5 milliLiter(s) Swish and Spit every 4 hours  docusate sodium 100 milliGRAM(s) Oral three times a day  enoxaparin Injectable 40 milliGRAM(s) SubCutaneous daily  insulin lispro (HumaLOG) corrective regimen sliding scale   SubCutaneous three times a day before meals  insulin lispro (HumaLOG) corrective regimen sliding scale   SubCutaneous at bedtime  metoprolol tartrate 25 milliGRAM(s) Oral every 8 hours  metoprolol tartrate Injectable 5 milliGRAM(s) IV Push once  pantoprazole    Tablet 40 milliGRAM(s) Oral before breakfast  polyethylene glycol 3350 17 Gram(s) Oral daily  potassium chloride  10 mEq/50 mL IVPB 10 milliEquivalent(s) IV Intermittent every 1 hour  potassium chloride  10 mEq/50 mL IVPB 10 milliEquivalent(s) IV Intermittent every 1 hour  potassium chloride  10 mEq/50 mL IVPB 10 milliEquivalent(s) IV Intermittent every 1 hour  sodium chloride 0.9%. 1000 milliLiter(s) IV Continuous <Continuous>      PHYSICAL EXAM  GEN: No apparent distress, examined in   PSYCH: Appropriate, communicative, A+Ox3  NEURO: Non-focal,  A+Ox 3  CV: S1 S2 without rub or murmur  MEDIASTINUM: Sternal incision covered with dressing, CDI, stable  PACING WIRES:   VVI [ x ]  setting:      Isolated/Insulated [  ]  DRAINS:  Delta [  ]  Drainage:         Pleural [  ]  Drainage:    PULMONARY:  CTA, Decreased left base   INCENTIVE SPIROMETRY:  ABDOMEN:  Soft, non-tender, non-distended, bowel sounds active x 4  LBM:  : voiding   VASCULAR: +pp +radial  SKIN: Warm, dry, intact   leg incision:  ACE [  ]  MUSCULOSKELETAL:  Moves all extremities equally  PHYSICAL THERAPY REC:  Home [  ]  Home w PT [   ]   MONET [   ]    A:  P:  STAT electrolytes  5mg IVP lopressor  continue amio loading   VITAL SIGNS  T(F): 98.8  HR: 115  BP: 111/69  RR: 18  SpO2: 98%    08-09-18 @ 07:01  -  08-10-18 @ 07:00  --------------------------------------------------------  OUT: 1050 mL / NET: -1050 mL    08-10-18 @ 07:01  -  08-10-18 @ 23:33  --------------------------------------------------------  OUT: 900 mL / NET: -900 mL        LAB                        10.1   13.9  )-----------( 133      ( 10 Aug 2018 06:36 )             31.1   138  |  101  |  21  ----------------------------<  118<H>  4.4   |  29  |  1.12    CAPILLARY BLOOD GLUCOSE    DIAGNOSTICS  Xray Chest 1 View- PORTABLE-Routine (08.10.18 @ 06:34)  Small left apical pneumothorax is unchanged from prior study. Left lung   base atelectasis. No pleural effusion. Cardiac silhouette is enlarged.   Sternotomy wires.  IMPRESSION:  Small left apical pneumothorax is unchanged from prior study.    MEDICATIONS  amiodarone    Tablet 400 milliGRAM(s) Oral every 8 hours  aspirin enteric coated 325 milliGRAM(s) Oral daily  atorvastatin 40 milliGRAM(s) Oral at bedtime  chlorhexidine 0.12% Liquid 5 milliLiter(s) Swish and Spit every 4 hours  docusate sodium 100 milliGRAM(s) Oral three times a day  enoxaparin Injectable 40 milliGRAM(s) SubCutaneous daily  insulin lispro (HumaLOG) corrective regimen sliding scale   SubCutaneous three times a day before meals  insulin lispro (HumaLOG) corrective regimen sliding scale   SubCutaneous at bedtime  metoprolol tartrate 25 milliGRAM(s) Oral every 8 hours  metoprolol tartrate Injectable 5 milliGRAM(s) IV Push once  pantoprazole    Tablet 40 milliGRAM(s) Oral before breakfast  polyethylene glycol 3350 17 Gram(s) Oral daily  potassium chloride  10 mEq/50 mL IVPB 10 milliEquivalent(s) IV Intermittent every 1 hour  potassium chloride  10 mEq/50 mL IVPB 10 milliEquivalent(s) IV Intermittent every 1 hour  potassium chloride  10 mEq/50 mL IVPB 10 milliEquivalent(s) IV Intermittent every 1 hour  sodium chloride 0.9%. 1000 milliLiter(s) IV Continuous <Continuous>      PHYSICAL EXAM  GEN: No apparent distress, examined in   PSYCH: Appropriate, communicative, A+Ox3  NEURO: Non-focal,  A+Ox 3  CV: S1 S2 without rub or murmur  MEDIASTINUM: Sternal incision covered with dressing, CDI, stable  PACING WIRES:   VVI [ x ]  setting:      Isolated/Insulated [  ]  DRAINS:  Delta [  ]  Drainage:         Pleural [  ]  Drainage:    PULMONARY:  CTA, Decreased left base   INCENTIVE SPIROMETRY:  ABDOMEN:  Soft, non-tender, non-distended, bowel sounds active x 4  LBM:  : voiding   VASCULAR: +pp +radial  SKIN: Warm, dry, intact   leg incision:  ACE [  ]  MUSCULOSKELETAL:  Moves all extremities equally  PHYSICAL THERAPY REC:  Home [  ]  Home w PT [   ]   MONET [   ]    A:  P:  STAT electrolytes  5mg IVP lopressor  continue amio loading

## 2018-08-10 NOTE — DISCHARGE NOTE ADULT - MEDICATION SUMMARY - MEDICATIONS TO TAKE
I will START or STAY ON the medications listed below when I get home from the hospital:    aspirin 81 mg oral delayed release tablet  -- 1 tab(s) by mouth once a day  -- Indication: For Blood thinner for graft patency     acetaminophen 325 mg oral tablet  -- 2 tab(s) by mouth every 6 hours, As needed, Mild Pain (1 - 3)  -- Indication: For As needed for pain     amiodarone 200 mg oral tablet  -- 1 tab(s) by mouth once a day   -- Indication: For for Afib     apixaban 5 mg oral tablet  -- 1 tab(s) by mouth every 12 hours  -- Indication: For Blood thinner for Afib     Zocor 20 mg oral tablet  -- 1 tab(s) by mouth once a day (at bedtime)  -- Indication: For Cholesterol     metoprolol succinate 100 mg oral tablet, extended release  -- 1 tab(s) by mouth once a day  -- Indication: For Blood pressure and heart rate control     docusate sodium 100 mg oral capsule  -- 1 cap(s) by mouth 3 times a day, As Needed -for constipation   -- Indication: For  Stool Softner As needed for constipation     polyethylene glycol 3350 oral powder for reconstitution  -- 17 gram(s) by mouth once a day, As Needed -for constipation   -- Indication: For Stool Laxative as needed for constipation     pantoprazole 40 mg oral delayed release tablet  -- 1 tab(s) by mouth once a day (before a meal)  -- Indication: For Acid Reflux

## 2018-08-10 NOTE — DISCHARGE NOTE ADULT - HOME CARE AGENCY
[facility] [phone] for visiting nurse. Agency will contact you to schedule visit

## 2018-08-10 NOTE — PROGRESS NOTE ADULT - ASSESSMENT
1. post day 3 CABG  2. hemodynamically stable  3. dizziness- BP ok hemodynamically stable  4. anemia  5. short run of SVT on lporessor    Recommend  incentive spirometry  proceed as per CT surgery  follow hgb/hct

## 2018-08-10 NOTE — PROGRESS NOTE ADULT - SUBJECTIVE AND OBJECTIVE BOX
Subjective: Patient seen and examined. No new events except as noted.   POD 3 post CABG x 4  awake alert c/o some dizziness this morning no cp or sob  chest tube still in place  MEDICATIONS:  MEDICATIONS  (STANDING):  aspirin enteric coated 81 milliGRAM(s) Oral daily  cefuroxime  IVPB 1500 milliGRAM(s) IV Intermittent every 8 hours  chlorhexidine 0.12% Liquid 5 milliLiter(s) Swish and Spit every 4 hours  dexmedetomidine Infusion 0.5 MICROgram(s)/kG/Hr (9.35 mL/Hr) IV Continuous <Continuous>  dextrose 50% Injectable 50 milliLiter(s) IV Push every 15 minutes  dextrose 50% Injectable 25 milliLiter(s) IV Push every 15 minutes  DOBUTamine Infusion 5 MICROgram(s)/kG/Min (11.22 mL/Hr) IV Continuous <Continuous>  docusate sodium 100 milliGRAM(s) Oral three times a day  enoxaparin Injectable 40 milliGRAM(s) SubCutaneous daily  famotidine Injectable 20 milliGRAM(s) IV Push every 12 hours  insulin Infusion 2 Unit(s)/Hr (2 mL/Hr) IV Continuous <Continuous>  meperidine     Injectable 25 milliGRAM(s) IV Push once  multiple electrolytes Injection Type 1 1000 milliLiter(s) (400 mL/Hr) IV Continuous <Continuous>  niCARdipine Infusion 5 mG/Hr (25 mL/Hr) IV Continuous <Continuous>  norepinephrine Infusion 0.05 MICROgram(s)/kG/Min (7.013 mL/Hr) IV Continuous <Continuous>  polyethylene glycol 3350 17 Gram(s) Oral daily  potassium chloride  10 mEq/50 mL IVPB 10 milliEquivalent(s) IV Intermittent every 1 hour  potassium chloride  10 mEq/50 mL IVPB 10 milliEquivalent(s) IV Intermittent every 1 hour  potassium chloride  10 mEq/50 mL IVPB 10 milliEquivalent(s) IV Intermittent every 1 hour  sodium chloride 0.9% 500 milliLiter(s) (100 mL/Hr) IV Continuous <Continuous>  sodium chloride 0.9%. 1000 milliLiter(s) (10 mL/Hr) IV Continuous <Continuous>    Vital Signs Last 24 Hrs  T(C): 36.9 (08-10-18 @ 05:58), Max: 36.9 (08-09-18 @ 20:27)  T(F): 98.5 (08-10-18 @ 05:58), Max: 98.5 (08-09-18 @ 20:27)  HR: 85 (08-10-18 @ 05:58) (74 - 90)  BP: 132/70 (08-10-18 @ 05:58) (110/69 - 144/79)  BP(mean): --  RR: 18 (08-10-18 @ 05:58) (18 - 18)  SpO2: 90% (08-10-18 @ 05:58) (90% - 97%)    Height (cm): 172.7 (08-07 @ 14:15)  Weight (kg): 74.8 (08-07 @ 14:15)  BMI (kg/m2): 25.1 (08-07 @ 14:15)  BSA (m2): 1.88 (08-07 @ 14:15)    Appearance: Normal	chest tube still in place  HEENT:   Normal oral mucosa, PERRL, EOMI	  Cardiovascular: Normal S1 S2, No JVD, No murmurs ,  Respiratory: Lungs decreased BS left base  Gastrointestinal:  Soft, Non-tender, + BS	  Psychiatry:  Mood & affect appropriate  Ext: No edema  Peripheral pulses palpable 2+ bilaterally      LABS:          TELEMETRY: 	    ECG:  NSR psot op day  3  sort episoed of SVT  PVcs

## 2018-08-10 NOTE — PROGRESS NOTE ADULT - PROBLEM SELECTOR PLAN 1
c/w asa.beta blocker on lopressor 25mg po q8   maintain    meds tubes today  monitor outpu,t   isolated  pw  maintain pw

## 2018-08-11 DIAGNOSIS — I48.91 UNSPECIFIED ATRIAL FIBRILLATION: ICD-10-CM

## 2018-08-11 LAB
ANION GAP SERPL CALC-SCNC: 11 MMOL/L — SIGNIFICANT CHANGE UP (ref 5–17)
BUN SERPL-MCNC: 20 MG/DL — SIGNIFICANT CHANGE UP (ref 7–23)
CALCIUM SERPL-MCNC: 8.7 MG/DL — SIGNIFICANT CHANGE UP (ref 8.4–10.5)
CHLORIDE SERPL-SCNC: 102 MMOL/L — SIGNIFICANT CHANGE UP (ref 96–108)
CO2 SERPL-SCNC: 24 MMOL/L — SIGNIFICANT CHANGE UP (ref 22–31)
CREAT SERPL-MCNC: 0.98 MG/DL — SIGNIFICANT CHANGE UP (ref 0.5–1.3)
GLUCOSE BLDC GLUCOMTR-MCNC: 115 MG/DL — HIGH (ref 70–99)
GLUCOSE BLDC GLUCOMTR-MCNC: 115 MG/DL — HIGH (ref 70–99)
GLUCOSE BLDC GLUCOMTR-MCNC: 121 MG/DL — HIGH (ref 70–99)
GLUCOSE BLDC GLUCOMTR-MCNC: 99 MG/DL — SIGNIFICANT CHANGE UP (ref 70–99)
GLUCOSE SERPL-MCNC: 115 MG/DL — HIGH (ref 70–99)
HCT VFR BLD CALC: 29.7 % — LOW (ref 39–50)
HGB BLD-MCNC: 9.9 G/DL — LOW (ref 13–17)
MCHC RBC-ENTMCNC: 32.3 PG — SIGNIFICANT CHANGE UP (ref 27–34)
MCHC RBC-ENTMCNC: 33.3 GM/DL — SIGNIFICANT CHANGE UP (ref 32–36)
MCV RBC AUTO: 97 FL — SIGNIFICANT CHANGE UP (ref 80–100)
PLATELET # BLD AUTO: 153 K/UL — SIGNIFICANT CHANGE UP (ref 150–400)
POTASSIUM SERPL-MCNC: 4.1 MMOL/L — SIGNIFICANT CHANGE UP (ref 3.5–5.3)
POTASSIUM SERPL-SCNC: 4.1 MMOL/L — SIGNIFICANT CHANGE UP (ref 3.5–5.3)
RBC # BLD: 3.06 M/UL — LOW (ref 4.2–5.8)
RBC # FLD: 12.4 % — SIGNIFICANT CHANGE UP (ref 10.3–14.5)
SODIUM SERPL-SCNC: 137 MMOL/L — SIGNIFICANT CHANGE UP (ref 135–145)
WBC # BLD: 10.7 K/UL — HIGH (ref 3.8–10.5)
WBC # FLD AUTO: 10.7 K/UL — HIGH (ref 3.8–10.5)

## 2018-08-11 PROCEDURE — 71045 X-RAY EXAM CHEST 1 VIEW: CPT | Mod: 26,77

## 2018-08-11 PROCEDURE — 71045 X-RAY EXAM CHEST 1 VIEW: CPT | Mod: 26

## 2018-08-11 RX ADMIN — Medication 650 MILLIGRAM(S): at 05:32

## 2018-08-11 RX ADMIN — Medication 325 MILLIGRAM(S): at 12:16

## 2018-08-11 RX ADMIN — ENOXAPARIN SODIUM 40 MILLIGRAM(S): 100 INJECTION SUBCUTANEOUS at 12:16

## 2018-08-11 RX ADMIN — CHLORHEXIDINE GLUCONATE 5 MILLILITER(S): 213 SOLUTION TOPICAL at 05:28

## 2018-08-11 RX ADMIN — ATORVASTATIN CALCIUM 40 MILLIGRAM(S): 80 TABLET, FILM COATED ORAL at 21:34

## 2018-08-11 RX ADMIN — AMIODARONE HYDROCHLORIDE 400 MILLIGRAM(S): 400 TABLET ORAL at 05:27

## 2018-08-11 RX ADMIN — PANTOPRAZOLE SODIUM 40 MILLIGRAM(S): 20 TABLET, DELAYED RELEASE ORAL at 08:39

## 2018-08-11 RX ADMIN — CHLORHEXIDINE GLUCONATE 5 MILLILITER(S): 213 SOLUTION TOPICAL at 16:48

## 2018-08-11 RX ADMIN — Medication 25 MILLIGRAM(S): at 16:51

## 2018-08-11 RX ADMIN — Medication 25 MILLIGRAM(S): at 05:27

## 2018-08-11 RX ADMIN — AMIODARONE HYDROCHLORIDE 400 MILLIGRAM(S): 400 TABLET ORAL at 16:49

## 2018-08-11 RX ADMIN — Medication 5 MILLIGRAM(S): at 00:00

## 2018-08-11 NOTE — PROGRESS NOTE ADULT - ASSESSMENT
73M -pmh of HTN with complaints of SSCP  with LHC revealing MVD now s/p CABGx 4 ( ef40%) with Dr Tamayo on 8/7/18 8/7 tx to floor   8/9   lt pleural chest tube removed,   ambulating,  isolate  pw  8/10     meds   draining,     ambulating    increased Lop to 25 q 8   for PAT  8/11    NSR   brief afib overnight   started on amio load last evening,   ambulating    PW ,   chest tube removed

## 2018-08-11 NOTE — PROGRESS NOTE ADULT - ASSESSMENT
1. post day 4 CABG  2. hemodynamically stable  3. paroxysmal atrial fibrilla now in normal sinus rhythm  4. anemia improved  5. short run of SVT on lporessor    Recommend  incentive spirometry  continue Lopressor 25 3 times a day  Out of bed a ambulate after chest tube removed as per CT surgry  Discharge planning next 24-48 hour  Monitor heart rate careflly

## 2018-08-11 NOTE — PROGRESS NOTE ADULT - SUBJECTIVE AND OBJECTIVE BOX
Subjective: Patient seen and examined. No new events except as noted.   chest tube removed this morning  developed paroxysmal atrial fibrillation now back in normal sinus rhythm on Lopressor 25 mg 3 times a day  No chest paignificant shortness of breath	  MEDICATIONS:  MEDICATIONS  (STANDING):  amiodarone    Tablet 400 milliGRAM(s) Oral every 8 hours  aspirin enteric coated 325 milliGRAM(s) Oral daily  atorvastatin 40 milliGRAM(s) Oral at bedtime  chlorhexidine 0.12% Liquid 5 milliLiter(s) Swish and Spit every 4 hours  dextrose 5%. 1000 milliLiter(s) (50 mL/Hr) IV Continuous <Continuous>  dextrose 50% Injectable 12.5 Gram(s) IV Push once  dextrose 50% Injectable 25 Gram(s) IV Push once  dextrose 50% Injectable 25 Gram(s) IV Push once  dextrose 50% Injectable 50 milliLiter(s) IV Push every 15 minutes  dextrose 50% Injectable 25 milliLiter(s) IV Push every 15 minutes  docusate sodium 100 milliGRAM(s) Oral three times a day  enoxaparin Injectable 40 milliGRAM(s) SubCutaneous daily  insulin lispro (HumaLOG) corrective regimen sliding scale   SubCutaneous three times a day before meals  insulin lispro (HumaLOG) corrective regimen sliding scale   SubCutaneous at bedtime  metoprolol tartrate 25 milliGRAM(s) Oral every 8 hours  pantoprazole    Tablet 40 milliGRAM(s) Oral before breakfast  polyethylene glycol 3350 17 Gram(s) Oral daily  potassium chloride  10 mEq/50 mL IVPB 10 milliEquivalent(s) IV Intermittent every 1 hour  potassium chloride  10 mEq/50 mL IVPB 10 milliEquivalent(s) IV Intermittent every 1 hour  potassium chloride  10 mEq/50 mL IVPB 10 milliEquivalent(s) IV Intermittent every 1 hour  sodium chloride 0.9%. 1000 milliLiter(s) (10 mL/Hr) IV Continuous <Continuous>      PHYSICAL EXAM:  T(C): 37 (08-11-18 @ 05:00), Max: 37.1 (08-10-18 @ 19:36)  HR: 73 (08-11-18 @ 05:00) (73 - 140)  BP: 110/69 (08-11-18 @ 05:00) (102/50 - 117/68)  RR: 18 (08-11-18 @ 05:00) (18 - 18)  SpO2: 97% (08-11-18 @ 05:00) (94% - 100%)  Wt(kg): --  I&O's Summary    10 Aug 2018 07:01  -  11 Aug 2018 07:00  --------------------------------------------------------  IN: 360 mL / OUT: 930 mL / NET: -570 mL    11 Aug 2018 07:01  -  11 Aug 2018 12:40  --------------------------------------------------------  IN: 360 mL / OUT: 0 mL / NET: 360 mL          Appearance: Normal anxious no acute distress chest tube rmoved	  HEENT:   Normal oral mucosa, PERRL, EOMI	  Cardiovascular: Normal S1 S2, No JVD, No murmurs ,rhythm regular today  Respiratory: Lungs clear to auscultation, normal effort 	  Gastrointestinal:  Soft, Non-tender, + BS	  Psychiatry:  Mood & affect appropriate  Ext: No edema  Peripheral pulses palpable 2+ bilaterally      LABS:    CARDIAC MARKERS:                                9.9    10.7  )-----------( 153      ( 11 Aug 2018 06:37 )             29.7     08-11    137  |  102  |  20  ----------------------------<  115<H>  4.1   |  24  |  0.98    Ca    8.7      11 Aug 2018 06:37      proBNP:   Lipid Profile:   HgA1c:   TSH:           TELEMETRY: 	    ECG:  	  RADIOLOGY:   DIAGNOSTIC TESTING:  [ ] Echocardiogram:  [ ]  Catheterization:  [ ] Stress Test:    OTHER:

## 2018-08-11 NOTE — PROGRESS NOTE ADULT - SUBJECTIVE AND OBJECTIVE BOX
VITAL SIGNS    Telemetry:  sr  50-70    brief afib  overnight    Vital Signs Last 24 Hrs  T(C): 37 (18 @ 05:00), Max: 37.1 (08-10-18 @ 19:36)  T(F): 98.6 (18 @ 05:00), Max: 98.8 (08-10-18 @ 19:36)  HR: 73 (18 @ 05:00) (73 - 140)  BP: 110/69 (18 @ 05:00) (102/50 - 117/68)  RR: 18 (18 @ 05:00) (18 - 18)  SpO2: 97% (18 @ 05:00) (94% - 100%)             Daily Weight in k.8 (11 Aug 2018 08:00)        CAPILLARY BLOOD GLUCOSE      POCT Blood Glucose.: 99 mg/dL (11 Aug 2018 11:54)  POCT Blood Glucose.: 121 mg/dL (11 Aug 2018 07:39)  POCT Blood Glucose.: 149 mg/dL (10 Aug 2018 21:50)  POCT Blood Glucose.: 112 mg/dL (10 Aug 2018 16:37)          Drains:   out    Pacing Wires                                  Isolated                            PHYSICAL EXAM    Neurology: alert and oriented x 3, moves all extremities with no defecits  CV :  RRR  Sternal Wound :  CDI , Stable with pw  Lungs:   CTA B/L  Abdomen: soft, nontender, nondistended, positive bowel sounds, last bowel movement     Extremities:       trace le edema   no calf tenderness

## 2018-08-12 LAB
ANION GAP SERPL CALC-SCNC: 10 MMOL/L — SIGNIFICANT CHANGE UP (ref 5–17)
BUN SERPL-MCNC: 18 MG/DL — SIGNIFICANT CHANGE UP (ref 7–23)
CALCIUM SERPL-MCNC: 8.8 MG/DL — SIGNIFICANT CHANGE UP (ref 8.4–10.5)
CHLORIDE SERPL-SCNC: 103 MMOL/L — SIGNIFICANT CHANGE UP (ref 96–108)
CO2 SERPL-SCNC: 24 MMOL/L — SIGNIFICANT CHANGE UP (ref 22–31)
CREAT SERPL-MCNC: 1.1 MG/DL — SIGNIFICANT CHANGE UP (ref 0.5–1.3)
GLUCOSE BLDC GLUCOMTR-MCNC: 114 MG/DL — HIGH (ref 70–99)
GLUCOSE BLDC GLUCOMTR-MCNC: 116 MG/DL — HIGH (ref 70–99)
GLUCOSE BLDC GLUCOMTR-MCNC: 120 MG/DL — HIGH (ref 70–99)
GLUCOSE BLDC GLUCOMTR-MCNC: 123 MG/DL — HIGH (ref 70–99)
GLUCOSE SERPL-MCNC: 119 MG/DL — HIGH (ref 70–99)
HCT VFR BLD CALC: 29.4 % — LOW (ref 39–50)
HGB BLD-MCNC: 9.9 G/DL — LOW (ref 13–17)
MCHC RBC-ENTMCNC: 32.7 PG — SIGNIFICANT CHANGE UP (ref 27–34)
MCHC RBC-ENTMCNC: 33.8 GM/DL — SIGNIFICANT CHANGE UP (ref 32–36)
MCV RBC AUTO: 96.7 FL — SIGNIFICANT CHANGE UP (ref 80–100)
PLATELET # BLD AUTO: 204 K/UL — SIGNIFICANT CHANGE UP (ref 150–400)
POTASSIUM SERPL-MCNC: 4.6 MMOL/L — SIGNIFICANT CHANGE UP (ref 3.5–5.3)
POTASSIUM SERPL-SCNC: 4.6 MMOL/L — SIGNIFICANT CHANGE UP (ref 3.5–5.3)
RBC # BLD: 3.04 M/UL — LOW (ref 4.2–5.8)
RBC # FLD: 12.4 % — SIGNIFICANT CHANGE UP (ref 10.3–14.5)
SODIUM SERPL-SCNC: 137 MMOL/L — SIGNIFICANT CHANGE UP (ref 135–145)
WBC # BLD: 9.6 K/UL — SIGNIFICANT CHANGE UP (ref 3.8–10.5)
WBC # FLD AUTO: 9.6 K/UL — SIGNIFICANT CHANGE UP (ref 3.8–10.5)

## 2018-08-12 PROCEDURE — 93010 ELECTROCARDIOGRAM REPORT: CPT

## 2018-08-12 RX ADMIN — Medication 100 MILLIGRAM(S): at 21:23

## 2018-08-12 RX ADMIN — Medication 25 MILLIGRAM(S): at 07:59

## 2018-08-12 RX ADMIN — Medication 325 MILLIGRAM(S): at 11:01

## 2018-08-12 RX ADMIN — ATORVASTATIN CALCIUM 40 MILLIGRAM(S): 80 TABLET, FILM COATED ORAL at 21:23

## 2018-08-12 RX ADMIN — AMIODARONE HYDROCHLORIDE 400 MILLIGRAM(S): 400 TABLET ORAL at 13:31

## 2018-08-12 RX ADMIN — PANTOPRAZOLE SODIUM 40 MILLIGRAM(S): 20 TABLET, DELAYED RELEASE ORAL at 06:26

## 2018-08-12 RX ADMIN — Medication 25 MILLIGRAM(S): at 13:32

## 2018-08-12 RX ADMIN — Medication 25 MILLIGRAM(S): at 21:23

## 2018-08-12 RX ADMIN — Medication 100 MILLIGRAM(S): at 13:31

## 2018-08-12 RX ADMIN — AMIODARONE HYDROCHLORIDE 400 MILLIGRAM(S): 400 TABLET ORAL at 21:23

## 2018-08-12 RX ADMIN — POLYETHYLENE GLYCOL 3350 17 GRAM(S): 17 POWDER, FOR SOLUTION ORAL at 11:01

## 2018-08-12 RX ADMIN — AMIODARONE HYDROCHLORIDE 400 MILLIGRAM(S): 400 TABLET ORAL at 00:06

## 2018-08-12 RX ADMIN — AMIODARONE HYDROCHLORIDE 400 MILLIGRAM(S): 400 TABLET ORAL at 07:59

## 2018-08-12 RX ADMIN — Medication 25 MILLIGRAM(S): at 00:07

## 2018-08-12 RX ADMIN — ENOXAPARIN SODIUM 40 MILLIGRAM(S): 100 INJECTION SUBCUTANEOUS at 11:01

## 2018-08-12 NOTE — PROGRESS NOTE ADULT - PROBLEM SELECTOR PLAN 1
Continue asa, beta blocker on lopressor 25mg po q8, statin.  Titrate up beta-blocker as tolerated   DC PW per Dr. Salazar  C/W GI prophylaxis on protonix and DVT prophylaxis on SQ Lovenox.   Ambulate 4x daily as tolerated.  Cough and deep breathe, Incentive Spirometry Q1h, Chest PT.  Disposition: Home Monday if rhythm stable.

## 2018-08-12 NOTE — PROGRESS NOTE ADULT - SUBJECTIVE AND OBJECTIVE BOX
Subjective: Pt states "I'm feeling well" denies any CP, SOB, N/V and palpitations. No acute events overnight.     Telemetry:  SR 80s (5H afib in -130)  Vital Signs Last 24 Hrs  T(C): 37 (18 @ 05:02), Max: 37 (18 @ 19:57)  T(F): 98.6 (18 @ 05:02), Max: 98.6 (18 @ 19:57)  HR: 106 (18 @ 05:02) (65 - 115)  BP: 105/69 (18 @ 05:02) (103/68 - 123/64)  RR: 18 (18 @ 05:02) (18 - 18)  SpO2: 95% (18 @ 05:02) (94% - 97%)          @ 07:01  -   @ 10:55  --------------------------------------------------------  IN: 350 mL / OUT: 0 mL / NET: 350 mL    Daily Weight in k.2 (12 Aug 2018 08:49)    CAPILLARY BLOOD GLUCOSE  115 - 123          PHYSICAL EXAM  Neurology: A&Ox3, nonfocal, no gross deficits  CV : RRR+S1S2  Sternal Wound: MSI CDI MOMO, Stable  +PW I+I  Lungs: Respirations non-labored, B/L BS CTA  Abdomen: Soft, NT/ND, +BSx4Q, last BM 8/10 (-)N/V/D  : Voiding without difficulty  Extremities: B/L LE no edema, negative calf tenderness, +PP, RLE SVG incision CDI MOMO        MEDICATIONS  acetaminophen   Tablet. 650 milliGRAM(s) Oral every 6 hours PRN  amiodarone    Tablet 400 milliGRAM(s) Oral every 8 hours  aspirin enteric coated 325 milliGRAM(s) Oral daily  atorvastatin 40 milliGRAM(s) Oral at bedtime  chlorhexidine 0.12% Liquid 5 milliLiter(s) Swish and Spit every 4 hours  docusate sodium 100 milliGRAM(s) Oral three times a day  enoxaparin Injectable 40 milliGRAM(s) SubCutaneous daily  insulin lispro (HumaLOG) corrective regimen sliding scale   SubCutaneous three times a day before meals  insulin lispro (HumaLOG) corrective regimen sliding scale   SubCutaneous at bedtime  metoprolol tartrate 25 milliGRAM(s) Oral every 8 hours  oxyCODONE    IR 5 milliGRAM(s) Oral every 6 hours PRN  pantoprazole    Tablet 40 milliGRAM(s) Oral before breakfast  polyethylene glycol 3350 17 Gram(s) Oral daily  sodium chloride 0.9%. 1000 milliLiter(s) IV Continuous <Continuous>      Physical Therapy Rec:   Home  [ X ]   Home w/ PT  [  ]  Rehab  [  ]    Discussed with Cardiothoracic Team at AM rounds.

## 2018-08-12 NOTE — PROGRESS NOTE ADULT - ASSESSMENT
73M -pmh of HTN with complaints of SSCP  with LHC revealing MVD now s/p CABGx 4 ( ef40%) with Dr Tamayo on 8/7/18 8/7 tx to floor   8/9   lt pleural chest tube removed,   ambulating,  isolate  pw  8/10     meds   draining,     ambulating    increased Lop to 25 q 8   for PAT  8/11    NSR   brief afib overnight   started on amio load last evening,   ambulating    PW ,   chest tube removed  8/12 PW dc'd, pt converted to NSR 80s this AM, had 5h of Afib overnight rate 110-130s, continued on amio load

## 2018-08-13 LAB
ANION GAP SERPL CALC-SCNC: 9 MMOL/L — SIGNIFICANT CHANGE UP (ref 5–17)
APTT BLD: 27.5 SEC — SIGNIFICANT CHANGE UP (ref 27.5–37.4)
BUN SERPL-MCNC: 19 MG/DL — SIGNIFICANT CHANGE UP (ref 7–23)
CALCIUM SERPL-MCNC: 8.8 MG/DL — SIGNIFICANT CHANGE UP (ref 8.4–10.5)
CHLORIDE SERPL-SCNC: 102 MMOL/L — SIGNIFICANT CHANGE UP (ref 96–108)
CO2 SERPL-SCNC: 24 MMOL/L — SIGNIFICANT CHANGE UP (ref 22–31)
CREAT SERPL-MCNC: 1.05 MG/DL — SIGNIFICANT CHANGE UP (ref 0.5–1.3)
GLUCOSE BLDC GLUCOMTR-MCNC: 108 MG/DL — HIGH (ref 70–99)
GLUCOSE BLDC GLUCOMTR-MCNC: 114 MG/DL — HIGH (ref 70–99)
GLUCOSE BLDC GLUCOMTR-MCNC: 114 MG/DL — HIGH (ref 70–99)
GLUCOSE BLDC GLUCOMTR-MCNC: 127 MG/DL — HIGH (ref 70–99)
GLUCOSE SERPL-MCNC: 116 MG/DL — HIGH (ref 70–99)
HCT VFR BLD CALC: 28.2 % — LOW (ref 39–50)
HCT VFR BLD CALC: 30.1 % — LOW (ref 39–50)
HGB BLD-MCNC: 10.1 G/DL — LOW (ref 13–17)
HGB BLD-MCNC: 9.6 G/DL — LOW (ref 13–17)
INR BLD: 1.09 RATIO — SIGNIFICANT CHANGE UP (ref 0.88–1.16)
MCHC RBC-ENTMCNC: 32.5 PG — SIGNIFICANT CHANGE UP (ref 27–34)
MCHC RBC-ENTMCNC: 32.8 PG — SIGNIFICANT CHANGE UP (ref 27–34)
MCHC RBC-ENTMCNC: 33.6 GM/DL — SIGNIFICANT CHANGE UP (ref 32–36)
MCHC RBC-ENTMCNC: 34.1 GM/DL — SIGNIFICANT CHANGE UP (ref 32–36)
MCV RBC AUTO: 96.2 FL — SIGNIFICANT CHANGE UP (ref 80–100)
MCV RBC AUTO: 96.8 FL — SIGNIFICANT CHANGE UP (ref 80–100)
PLATELET # BLD AUTO: 241 K/UL — SIGNIFICANT CHANGE UP (ref 150–400)
PLATELET # BLD AUTO: 300 K/UL — SIGNIFICANT CHANGE UP (ref 150–400)
POTASSIUM SERPL-MCNC: 4 MMOL/L — SIGNIFICANT CHANGE UP (ref 3.5–5.3)
POTASSIUM SERPL-SCNC: 4 MMOL/L — SIGNIFICANT CHANGE UP (ref 3.5–5.3)
PROTHROM AB SERPL-ACNC: 11.8 SEC — SIGNIFICANT CHANGE UP (ref 9.8–12.7)
RBC # BLD: 2.93 M/UL — LOW (ref 4.2–5.8)
RBC # BLD: 3.11 M/UL — LOW (ref 4.2–5.8)
RBC # FLD: 12.7 % — SIGNIFICANT CHANGE UP (ref 10.3–14.5)
RBC # FLD: 12.8 % — SIGNIFICANT CHANGE UP (ref 10.3–14.5)
SODIUM SERPL-SCNC: 135 MMOL/L — SIGNIFICANT CHANGE UP (ref 135–145)
WBC # BLD: 11.3 K/UL — HIGH (ref 3.8–10.5)
WBC # BLD: 11.5 K/UL — HIGH (ref 3.8–10.5)
WBC # FLD AUTO: 11.3 K/UL — HIGH (ref 3.8–10.5)
WBC # FLD AUTO: 11.5 K/UL — HIGH (ref 3.8–10.5)

## 2018-08-13 RX ORDER — METOPROLOL TARTRATE 50 MG
50 TABLET ORAL
Qty: 0 | Refills: 0 | Status: DISCONTINUED | OUTPATIENT
Start: 2018-08-13 | End: 2018-08-13

## 2018-08-13 RX ORDER — METOPROLOL TARTRATE 50 MG
50 TABLET ORAL ONCE
Qty: 0 | Refills: 0 | Status: COMPLETED | OUTPATIENT
Start: 2018-08-13 | End: 2018-08-13

## 2018-08-13 RX ORDER — WARFARIN SODIUM 2.5 MG/1
5 TABLET ORAL ONCE
Qty: 0 | Refills: 0 | Status: COMPLETED | OUTPATIENT
Start: 2018-08-13 | End: 2018-08-13

## 2018-08-13 RX ORDER — METOPROLOL TARTRATE 50 MG
100 TABLET ORAL DAILY
Qty: 0 | Refills: 0 | Status: DISCONTINUED | OUTPATIENT
Start: 2018-08-14 | End: 2018-08-15

## 2018-08-13 RX ORDER — HEPARIN SODIUM 5000 [USP'U]/ML
INJECTION INTRAVENOUS; SUBCUTANEOUS
Qty: 25000 | Refills: 0 | Status: DISCONTINUED | OUTPATIENT
Start: 2018-08-13 | End: 2018-08-15

## 2018-08-13 RX ADMIN — HEPARIN SODIUM 730 UNIT(S)/HR: 5000 INJECTION INTRAVENOUS; SUBCUTANEOUS at 13:08

## 2018-08-13 RX ADMIN — AMIODARONE HYDROCHLORIDE 400 MILLIGRAM(S): 400 TABLET ORAL at 06:07

## 2018-08-13 RX ADMIN — Medication 100 MILLIGRAM(S): at 06:07

## 2018-08-13 RX ADMIN — PANTOPRAZOLE SODIUM 40 MILLIGRAM(S): 20 TABLET, DELAYED RELEASE ORAL at 06:07

## 2018-08-13 RX ADMIN — ATORVASTATIN CALCIUM 40 MILLIGRAM(S): 80 TABLET, FILM COATED ORAL at 21:14

## 2018-08-13 RX ADMIN — Medication 50 MILLIGRAM(S): at 11:28

## 2018-08-13 RX ADMIN — Medication 325 MILLIGRAM(S): at 11:29

## 2018-08-13 RX ADMIN — WARFARIN SODIUM 5 MILLIGRAM(S): 2.5 TABLET ORAL at 21:13

## 2018-08-13 RX ADMIN — AMIODARONE HYDROCHLORIDE 400 MILLIGRAM(S): 400 TABLET ORAL at 21:14

## 2018-08-13 RX ADMIN — POLYETHYLENE GLYCOL 3350 17 GRAM(S): 17 POWDER, FOR SOLUTION ORAL at 11:29

## 2018-08-13 RX ADMIN — Medication 100 MILLIGRAM(S): at 21:13

## 2018-08-13 RX ADMIN — Medication 30 MILLILITER(S): at 11:30

## 2018-08-13 RX ADMIN — Medication 100 MILLIGRAM(S): at 13:59

## 2018-08-13 RX ADMIN — Medication 50 MILLIGRAM(S): at 06:08

## 2018-08-13 RX ADMIN — AMIODARONE HYDROCHLORIDE 400 MILLIGRAM(S): 400 TABLET ORAL at 13:59

## 2018-08-13 NOTE — PROGRESS NOTE ADULT - SUBJECTIVE AND OBJECTIVE BOX
VITAL SIGNS    Subjective: "I'm feeling well today; no complaints." Denies CP, palpitation, SOB, MEDEIROS, HA, dizziness, N/V/D, fever or chills.  No acute distress noted overnight.      Telemetry: NSR 60-80  --> converted to Afib 's  / NSR 50/60     Vital Signs Last 24 Hrs  T(C): 36.7 (18 @ 04:48), Max: 36.8 (18 @ 13:37)  T(F): 98 (18 @ 04:48), Max: 98.2 (18 @ 13:37)  HR: 63 (18 @ 04:48) (63 - 66)  BP: 121/71 (18 @ 04:48) (116/66 - 130/70)  RR: 18 (18 @ 04:48) (18 - 19)  SpO2: 96% (18 @ 04:48) (96% - 98%)            @ 07:01  -   @ 07:00  --------------------------------------------------------  IN: 940 mL / OUT: 0 mL / NET: 940 mL    Daily     Daily Weight in k.3 (13 Aug 2018 08:36)    CAPILLARY BLOOD GLUCOSE    POCT Blood Glucose.: 108 mg/dL (13 Aug 2018 11:58)  POCT Blood Glucose.: 114 mg/dL (13 Aug 2018 07:33)  POCT Blood Glucose.: 116 mg/dL (12 Aug 2018 21:36)  POCT Blood Glucose.: 114 mg/dL (12 Aug 2018 16:34)        PHYSICAL EXAM    Neurology: alert and oriented x 3, nonfocal, no gross deficits    CV: (+) S1 and S2, No murmurs, rubs, gallops or clicks     Sternal Wound:  MSI -->CDI , sternum stable    Lungs: CTA B/L     Abdomen: soft, nontender, nondistended, positive bowel sounds, (+) Flatus; (+) BM     :  Voiding               Extremities:  B/L LE (-) edema; negative calf tenderness; (+) 2 DP palpable        acetaminophen Tablet. 650 milliGRAM(s) Oral every 6 hours PRN  aluminum hydroxide/magnesium hydroxide/simethicone Suspension 30 milliLiter(s) Oral every 6 hours PRN  amiodarone Tablet 400 milliGRAM(s) Oral every 8 hours  aspirin enteric coated 325 milliGRAM(s) Oral daily  atorvastatin 40 milliGRAM(s) Oral at bedtime  docusate sodium 100 milliGRAM(s) Oral three times a day  glucagon  Injectable 1 milliGRAM(s) IntraMuscular once PRN  heparin  Infusion.  Unit(s)/Hr IV Continuous <Continuous>  insulin lispro (HumaLOG) corrective regimen sliding scale   SubCutaneous three times a day before meals  insulin lispro (HumaLOG) corrective regimen sliding scale   SubCutaneous at bedtime  oxyCODONE IR 5 milliGRAM(s) Oral every 6 hours PRN  pantoprazole Tablet 40 milliGRAM(s) Oral before breakfast  polyethylene glycol 3350 17 Gram(s) Oral daily  warfarin 5 milliGRAM(s) Oral once    Physical Therapy Rec:   Home  [  ]   Home w/ PT  [ X ]  Rehab  [  ]    Discussed with Cardiothoracic Team at AM rounds.

## 2018-08-13 NOTE — PROGRESS NOTE ADULT - ASSESSMENT
1. Day 6 status post four-vessel coronary bypass surgery  2. Paroxysmal  fibrillation  3. No angina or CHF  4. Hemodynamically stabl    Recommendati  Continue amiodarone alec, continue Lopressor, start Coumadin to maint  Discharge planning in the next 48 hours

## 2018-08-13 NOTE — PROGRESS NOTE ADULT - PROBLEM SELECTOR PLAN 1
Continue with  mg PO daily   Change lopressor to Toprol 100 mg PO daily   Continue with Statin   Increase activity as tolerated   Shower on POD #5   Continue with AC therapy   Continue with  GI prophylaxis on Protonix   Ambulate 4x daily as tolerated.  Encourage Cough and deep breathe, Incentive Spirometry Q1h, Chest PT.  Disposition: Home once medically cleared.

## 2018-08-13 NOTE — PROGRESS NOTE ADULT - ASSESSMENT
74 yo male with PMHx of HTN with complaints of SSCP s/p LHC revealing MVD now s/p 8/7/18 CABG x 4 (EF 40%) with Dr Tamayo   Post op Course:   8/7 Transferred  to SDU    8/9 Pleural chest tube removed, ambulating, isolate --> PW  8/10 MS CT draining;  ambulating; increased Lopressor to 25mg PO every 8 hours for PAT  8/11 NSR -->brief afib overnight; started on amio load last evening, ambulating; chest tube removed  8/12 PW d/c'd, pt converted to NSR 80s this AM, had 5h of Afib overnight rate 110-130s, continued on amio load  8/13 VVS; Overnight Afib 90-120s' --> AC therapy heparin gtt w/ coumadin 5 mg PO tonight initiated. Converted to NSR this AM. Lopressor changed to Toprol 100 mg PO daily.   Disposition: Home PT once medically cleared.

## 2018-08-13 NOTE — PROGRESS NOTE ADULT - SUBJECTIVE AND OBJECTIVE BOX
Subjective: Patient seen and examined. No new events except as noted.   patient feels well denies shortness  chest pain or palpitations  He has had recurrent paroxysmal atrial fibrillation  He is now on amiodarone l on metoprolol and Coumadin being started  MEDICATIONS:  MEDICATIONS  (STANDING):  amiodarone    Tablet 400 milliGRAM(s) Oral every 8 hours  aspirin enteric coated 325 milliGRAM(s) Oral daily  atorvastatin 40 milliGRAM(s) Oral at bedtime  dextrose 5%. 1000 milliLiter(s) (50 mL/Hr) IV Continuous <Continuous>  dextrose 50% Injectable 12.5 Gram(s) IV Push once  dextrose 50% Injectable 25 Gram(s) IV Push once  dextrose 50% Injectable 25 Gram(s) IV Push once  dextrose 50% Injectable 50 milliLiter(s) IV Push every 15 minutes  dextrose 50% Injectable 25 milliLiter(s) IV Push every 15 minutes  docusate sodium 100 milliGRAM(s) Oral three times a day  heparin  Infusion.  Unit(s)/Hr (7.3 mL/Hr) IV Continuous <Continuous>  insulin lispro (HumaLOG) corrective regimen sliding scale   SubCutaneous three times a day before meals  insulin lispro (HumaLOG) corrective regimen sliding scale   SubCutaneous at bedtime  metoprolol succinate ER 50 milliGRAM(s) Oral once  pantoprazole    Tablet 40 milliGRAM(s) Oral before breakfast  polyethylene glycol 3350 17 Gram(s) Oral daily  sodium chloride 0.9%. 1000 milliLiter(s) (10 mL/Hr) IV Continuous <Continuous>  warfarin 5 milliGRAM(s) Oral once      PHYSICAL EXAM:  T(C): 36.7 (08-13-18 @ 04:48), Max: 36.8 (08-12-18 @ 13:37)  HR: 63 (08-13-18 @ 04:48) (63 - 66)  BP: 121/71 (08-13-18 @ 04:48) (116/66 - 130/70)  RR: 18 (08-13-18 @ 04:48) (18 - 19)  SpO2: 96% (08-13-18 @ 04:48) (96% - 98%)  Wt(kg): --  I&O's Summary    12 Aug 2018 07:01  -  13 Aug 2018 07:00  --------------------------------------------------------  IN: 940 mL / OUT: 0 mL / NET: 940 mL          Appearance: Normal	anxious no acute distress  HEENT:   Normal oral mucosa, PERRL, EOMI	  Cardiovascular: Normal S1 S2, No JVD, No murmurs ,  Respiratory: Lungs clear to auscultation, normal effort 	  Gastrointestinal:  Soft, Non-tender, + BS	  Skin: No rashes, No ecchymoses, No cyanosis, warm to touch  Musculoskeletal: Normal range of motion, normal strength  Psychiatry:  Mood & affect appropriate  Ext: No edema  Peripheral pulses palpable 2+ bilaterally      LABS:    CARDIAC MARKERS:                                9.6    11.3  )-----------( 241      ( 13 Aug 2018 05:20 )             28.2     08-13    135  |  102  |  19  ----------------------------<  116<H>  4.0   |  24  |  1.05    Ca    8.8      13 Aug 2018 05:19

## 2018-08-14 LAB
ANION GAP SERPL CALC-SCNC: 11 MMOL/L — SIGNIFICANT CHANGE UP (ref 5–17)
APTT BLD: 36.9 SEC — SIGNIFICANT CHANGE UP (ref 27.5–37.4)
APTT BLD: 52.4 SEC — HIGH (ref 27.5–37.4)
APTT BLD: 66.4 SEC — HIGH (ref 27.5–37.4)
BUN SERPL-MCNC: 19 MG/DL — SIGNIFICANT CHANGE UP (ref 7–23)
CALCIUM SERPL-MCNC: 8.7 MG/DL — SIGNIFICANT CHANGE UP (ref 8.4–10.5)
CHLORIDE SERPL-SCNC: 101 MMOL/L — SIGNIFICANT CHANGE UP (ref 96–108)
CO2 SERPL-SCNC: 24 MMOL/L — SIGNIFICANT CHANGE UP (ref 22–31)
CREAT SERPL-MCNC: 1.07 MG/DL — SIGNIFICANT CHANGE UP (ref 0.5–1.3)
GLUCOSE BLDC GLUCOMTR-MCNC: 120 MG/DL — HIGH (ref 70–99)
GLUCOSE BLDC GLUCOMTR-MCNC: 120 MG/DL — HIGH (ref 70–99)
GLUCOSE BLDC GLUCOMTR-MCNC: 132 MG/DL — HIGH (ref 70–99)
GLUCOSE SERPL-MCNC: 126 MG/DL — HIGH (ref 70–99)
HCT VFR BLD CALC: 27.6 % — LOW (ref 39–50)
HGB BLD-MCNC: 9.6 G/DL — LOW (ref 13–17)
INR BLD: 1.07 RATIO — SIGNIFICANT CHANGE UP (ref 0.88–1.16)
MCHC RBC-ENTMCNC: 33.2 PG — SIGNIFICANT CHANGE UP (ref 27–34)
MCHC RBC-ENTMCNC: 34.6 GM/DL — SIGNIFICANT CHANGE UP (ref 32–36)
MCV RBC AUTO: 96.1 FL — SIGNIFICANT CHANGE UP (ref 80–100)
PLATELET # BLD AUTO: 277 K/UL — SIGNIFICANT CHANGE UP (ref 150–400)
POTASSIUM SERPL-MCNC: 4.3 MMOL/L — SIGNIFICANT CHANGE UP (ref 3.5–5.3)
POTASSIUM SERPL-SCNC: 4.3 MMOL/L — SIGNIFICANT CHANGE UP (ref 3.5–5.3)
PROTHROM AB SERPL-ACNC: 11.6 SEC — SIGNIFICANT CHANGE UP (ref 9.8–12.7)
RBC # BLD: 2.88 M/UL — LOW (ref 4.2–5.8)
RBC # FLD: 12.6 % — SIGNIFICANT CHANGE UP (ref 10.3–14.5)
SODIUM SERPL-SCNC: 136 MMOL/L — SIGNIFICANT CHANGE UP (ref 135–145)
WBC # BLD: 11.9 K/UL — HIGH (ref 3.8–10.5)
WBC # FLD AUTO: 11.9 K/UL — HIGH (ref 3.8–10.5)

## 2018-08-14 RX ORDER — WARFARIN SODIUM 2.5 MG/1
5 TABLET ORAL ONCE
Qty: 0 | Refills: 0 | Status: COMPLETED | OUTPATIENT
Start: 2018-08-14 | End: 2018-08-14

## 2018-08-14 RX ADMIN — Medication 100 MILLIGRAM(S): at 05:38

## 2018-08-14 RX ADMIN — Medication 100 MILLIGRAM(S): at 22:57

## 2018-08-14 RX ADMIN — WARFARIN SODIUM 5 MILLIGRAM(S): 2.5 TABLET ORAL at 22:56

## 2018-08-14 RX ADMIN — ATORVASTATIN CALCIUM 40 MILLIGRAM(S): 80 TABLET, FILM COATED ORAL at 22:57

## 2018-08-14 RX ADMIN — AMIODARONE HYDROCHLORIDE 400 MILLIGRAM(S): 400 TABLET ORAL at 05:38

## 2018-08-14 RX ADMIN — POLYETHYLENE GLYCOL 3350 17 GRAM(S): 17 POWDER, FOR SOLUTION ORAL at 11:32

## 2018-08-14 RX ADMIN — Medication 100 MILLIGRAM(S): at 14:33

## 2018-08-14 RX ADMIN — HEPARIN SODIUM 980 UNIT(S)/HR: 5000 INJECTION INTRAVENOUS; SUBCUTANEOUS at 16:26

## 2018-08-14 RX ADMIN — Medication 325 MILLIGRAM(S): at 11:32

## 2018-08-14 RX ADMIN — Medication 30 MILLILITER(S): at 11:32

## 2018-08-14 RX ADMIN — AMIODARONE HYDROCHLORIDE 400 MILLIGRAM(S): 400 TABLET ORAL at 14:34

## 2018-08-14 RX ADMIN — HEPARIN SODIUM 880 UNIT(S)/HR: 5000 INJECTION INTRAVENOUS; SUBCUTANEOUS at 02:02

## 2018-08-14 RX ADMIN — HEPARIN SODIUM 980 UNIT(S)/HR: 5000 INJECTION INTRAVENOUS; SUBCUTANEOUS at 09:16

## 2018-08-14 RX ADMIN — PANTOPRAZOLE SODIUM 40 MILLIGRAM(S): 20 TABLET, DELAYED RELEASE ORAL at 05:38

## 2018-08-14 NOTE — PROGRESS NOTE ADULT - PROBLEM SELECTOR PROBLEM 2
Atrial fibrillation
Anticoagulated on heparin
Atrial fibrillation

## 2018-08-14 NOTE — PROGRESS NOTE ADULT - PROBLEM SELECTOR PROBLEM 1
Coronary artery disease involving native coronary artery of native heart without angina pectoris
CAD, multiple vessel
Coronary artery disease involving native coronary artery of native heart without angina pectoris

## 2018-08-14 NOTE — PROGRESS NOTE ADULT - PROBLEM SELECTOR PLAN 2
Continue rate control amiodarone load   Change Lopressor to Toprol 100 mg PO daily   Start AC therapy Heparin gtt bridge and Coumadin 5 mg PO tonight   Daily PT/ INR   D/C Lovenox 2/2 full AC therapy
BB   and amio load
Continue rate control amiodarone load and beta-blocker  No AC, pt currently SR
Heparin gtt for ACS    send to OR
Start Heparin gtt for ACS 6 hours post sheath removal   ACS protocol, no initial bolus  PTT goal as per protocol
amiodarone load   Toprol 100 mg PO daily   Heparin gtt bridge and Coumadin 5 mg PO tonight   Daily PT/ INR

## 2018-08-14 NOTE — PROGRESS NOTE ADULT - ASSESSMENT
73M-PMHx of HTN with complaints of SSCP s/p LHC revealing MVD now s/p 8/7/18 CABG x 4 (EF 40%) with Dr Tamayo   Post op Course:   8/7 Tr  to SDU    8/9 Pleural chest tube removed, ambulating, isolate --> PW  8/10 MS CT draining;  ambulating; increased Lopressor to 25mg PO every 8 hours for PAT  8/11 NSR -->brief afib overnight; started on amio load last evening, ambulating; chest tube removed  8/12 PW d/c'd, pt converted to NSR 80s this AM, had 5h of Afib overnight rate 110-130s, continued on amio load  8/13 VVS; Overnight Afib 90-120s' --> AC therapy heparin gtt w/ coumadin 5 mg PO tonight initiated. Converted to NSR this AM. Lopressor changed to Toprol 100 mg PO daily.   8/14/18 SR overnight 60-70 w/ 3 seconds afib @ 130- will continue Toprol 100 & amio load - coumadin 5 tonight & heparin gtt  will d/c home Wednesday 8/15 if medically cleared.

## 2018-08-14 NOTE — PROGRESS NOTE ADULT - SUBJECTIVE AND OBJECTIVE BOX
Subjective: Patient seen and examined. No new events except as noted.   feels well reverted to normal sinus rhythm  Coumadin started  He feels well no chest pain or shortness of breath and anxious to go home  MEDICATIONS:  MEDICATIONS  (STANDING):  amiodarone    Tablet 200 milliGRAM(s) Oral daily  aspirin enteric coated 325 milliGRAM(s) Oral daily  atorvastatin 40 milliGRAM(s) Oral at bedtime  docusate sodium 100 milliGRAM(s) Oral three times a day  heparin  Infusion.  Unit(s)/Hr (7.3 mL/Hr) IV Continuous <Continuous>  metoprolol succinate  milliGRAM(s) Oral daily  pantoprazole    Tablet 40 milliGRAM(s) Oral before breakfast  polyethylene glycol 3350 17 Gram(s) Oral daily  sodium chloride 0.9%. 1000 milliLiter(s) (10 mL/Hr) IV Continuous <Continuous>  warfarin 5 milliGRAM(s) Oral once      PHYSICAL EXAM:  T(C): 36.8 (08-14-18 @ 14:04), Max: 36.8 (08-14-18 @ 05:00)  HR: 72 (08-14-18 @ 14:04) (62 - 72)  BP: 124/67 (08-14-18 @ 14:04) (124/67 - 131/68)  RR: 18 (08-14-18 @ 14:04) (18 - 18)  SpO2: 98% (08-14-18 @ 14:04) (97% - 100%)  Wt(kg): --  I&O's Summary    13 Aug 2018 07:01  -  14 Aug 2018 07:00  --------------------------------------------------------  IN: 400.5 mL / OUT: 0 mL / NET: 400.5 mL    14 Aug 2018 07:01  -  14 Aug 2018 16:41  --------------------------------------------------------  IN: 567.2 mL / OUT: 300 mL / NET: 267.2 mL          Appearance: Normal	  HEENT:   Normal oral mucosa, PERRL, EOMI	  Cardiovascular: Normal S1 S2, No JVD, No murmurs rhythm regular,  Respiratory: Lungs clear to auscultation, normal effort 	  Psychiatry:  Mood & affect appropriate  Ext: No edema  Peripheral pulses palpable 2+ bilaterally      LABS:    CARDIAC MARKERS:                                9.6    11.9  )-----------( 277      ( 14 Aug 2018 00:37 )             27.6     08-14    136  |  101  |  19  ----------------------------<  126<H>  4.3   |  24  |  1.07    Ca    8.7      14 Aug 2018 00:36      OTHER:

## 2018-08-14 NOTE — PROGRESS NOTE ADULT - SUBJECTIVE AND OBJECTIVE BOX
VITAL SIGNS-Tele:  SR 60-70 paf x 3sec @ 130  Vital Signs Last 24 Hrs  T(C): 36.8 (18 @ 05:00), Max: 36.8 (18 @ 05:00)  HR: 62 (18 @ 05:00) (62 - 70)  BP: 129/77 (18 @ 05:00) (110/66 - 131/68)  SpO2: 97% (18 @ 05:00) (97% - 100%)          @ 07:01  -   @ 07:00  --------------------------------------------------------  IN: 400.5 mL / OUT: 0 mL / NET: 400.5 mL     @ 07:01  -   @ 12:36  --------------------------------------------------------  IN: 18.6 mL / OUT: 0 mL / NET: 18.6 mL    Daily     Daily Weight in k.1 (14 Aug 2018 08:05)    Coumadin    [x ] YES          [  ]      NO         Reason:     paf  PHYSICAL EXAM:  Neurology: alert and oriented x 3, nonfocal, no gross deficits  CV : S1S2  Sternal Wound :  CDI , Stable  Lungs: CTA  Abdomen: soft, nontender, nondistended, positive bowel sounds, last bowel movement         Extremities:     no edema, no calf tenderness    acetaminophen   Tablet. 650 milliGRAM(s) Oral every 6 hours PRN  aluminum hydroxide/magnesium hydroxide/simethicone Suspension 30 milliLiter(s) Oral every 6 hours PRN  amiodarone    Tablet 400 milliGRAM(s) Oral every 8 hours  amiodarone    Tablet 200 milliGRAM(s) Oral daily  aspirin enteric coated 325 milliGRAM(s) Oral daily  atorvastatin 40 milliGRAM(s) Oral at bedtime  docusate sodium 100 milliGRAM(s) Oral three times a day  glucagon  Injectable 1 milliGRAM(s) IntraMuscular once PRN  heparin  Infusion.  Unit(s)/Hr IV Continuous <Continuous>  insulin lispro (HumaLOG) corrective regimen sliding scale   SubCutaneous three times a day before meals  insulin lispro (HumaLOG) corrective regimen sliding scale   SubCutaneous at bedtime  metoprolol succinate  milliGRAM(s) Oral daily  oxyCODONE    IR 5 milliGRAM(s) Oral every 6 hours PRN  pantoprazole    Tablet 40 milliGRAM(s) Oral before breakfast  polyethylene glycol 3350 17 Gram(s) Oral daily  sodium chloride 0.9%. 1000 milliLiter(s) IV Continuous <Continuous>  warfarin 5 milliGRAM(s) Oral once    Physical Therapy Rec:   Home  [ x ]   Home w/ PT  [  ]  Rehab  [  ]  Discussed with Cardiothoracic Team at AM rounds.

## 2018-08-14 NOTE — PROGRESS NOTE ADULT - ASSESSMENT
1. Day 7 status post four-vessel coronary bypass surgery  2. Paroxysmal  fibrillationnow in normal sinus rhythm  3. No angina or CHF  4. Hemodynamically stable    Recommendati  Continue amiodarone load, continue Lopressor,  Coumadin to maintain INR 2-2.5  Discharge planning in the next 48 hourswhen INR close to 2

## 2018-08-15 VITALS — WEIGHT: 162.04 LBS

## 2018-08-15 PROBLEM — I10 ESSENTIAL (PRIMARY) HYPERTENSION: Chronic | Status: ACTIVE | Noted: 2018-08-02

## 2018-08-15 LAB
ANION GAP SERPL CALC-SCNC: 13 MMOL/L — SIGNIFICANT CHANGE UP (ref 5–17)
APTT BLD: 102.5 SEC — HIGH (ref 27.5–37.4)
APTT BLD: 54.4 SEC — HIGH (ref 27.5–37.4)
BUN SERPL-MCNC: 14 MG/DL — SIGNIFICANT CHANGE UP (ref 7–23)
CALCIUM SERPL-MCNC: 8.8 MG/DL — SIGNIFICANT CHANGE UP (ref 8.4–10.5)
CHLORIDE SERPL-SCNC: 101 MMOL/L — SIGNIFICANT CHANGE UP (ref 96–108)
CO2 SERPL-SCNC: 24 MMOL/L — SIGNIFICANT CHANGE UP (ref 22–31)
CREAT SERPL-MCNC: 1.14 MG/DL — SIGNIFICANT CHANGE UP (ref 0.5–1.3)
GLUCOSE SERPL-MCNC: 121 MG/DL — HIGH (ref 70–99)
HCT VFR BLD CALC: 31.8 % — LOW (ref 39–50)
HGB BLD-MCNC: 10.5 G/DL — LOW (ref 13–17)
INR BLD: 1.31 RATIO — HIGH (ref 0.88–1.16)
MCHC RBC-ENTMCNC: 32.3 PG — SIGNIFICANT CHANGE UP (ref 27–34)
MCHC RBC-ENTMCNC: 33.1 GM/DL — SIGNIFICANT CHANGE UP (ref 32–36)
MCV RBC AUTO: 97.4 FL — SIGNIFICANT CHANGE UP (ref 80–100)
PLATELET # BLD AUTO: 368 K/UL — SIGNIFICANT CHANGE UP (ref 150–400)
POTASSIUM SERPL-MCNC: 4.3 MMOL/L — SIGNIFICANT CHANGE UP (ref 3.5–5.3)
POTASSIUM SERPL-SCNC: 4.3 MMOL/L — SIGNIFICANT CHANGE UP (ref 3.5–5.3)
PROTHROM AB SERPL-ACNC: 14.4 SEC — HIGH (ref 9.8–12.7)
RBC # BLD: 3.26 M/UL — LOW (ref 4.2–5.8)
RBC # FLD: 13.2 % — SIGNIFICANT CHANGE UP (ref 10.3–14.5)
SODIUM SERPL-SCNC: 138 MMOL/L — SIGNIFICANT CHANGE UP (ref 135–145)
WBC # BLD: 10.1 K/UL — SIGNIFICANT CHANGE UP (ref 3.8–10.5)
WBC # FLD AUTO: 10.1 K/UL — SIGNIFICANT CHANGE UP (ref 3.8–10.5)

## 2018-08-15 PROCEDURE — 99239 HOSP IP/OBS DSCHRG MGMT >30: CPT | Mod: 24

## 2018-08-15 RX ORDER — AMLODIPINE BESYLATE 2.5 MG/1
1 TABLET ORAL
Qty: 0 | Refills: 0 | COMMUNITY

## 2018-08-15 RX ORDER — APIXABAN 2.5 MG/1
1 TABLET, FILM COATED ORAL
Qty: 60 | Refills: 0 | OUTPATIENT
Start: 2018-08-15 | End: 2018-09-13

## 2018-08-15 RX ORDER — PANTOPRAZOLE SODIUM 20 MG/1
1 TABLET, DELAYED RELEASE ORAL
Qty: 30 | Refills: 0 | OUTPATIENT
Start: 2018-08-15 | End: 2018-09-13

## 2018-08-15 RX ORDER — APIXABAN 2.5 MG/1
5 TABLET, FILM COATED ORAL EVERY 12 HOURS
Qty: 0 | Refills: 0 | Status: DISCONTINUED | OUTPATIENT
Start: 2018-08-15 | End: 2018-08-15

## 2018-08-15 RX ORDER — AMIODARONE HYDROCHLORIDE 400 MG/1
1 TABLET ORAL
Qty: 30 | Refills: 0 | OUTPATIENT
Start: 2018-08-15 | End: 2018-09-13

## 2018-08-15 RX ORDER — POLYETHYLENE GLYCOL 3350 17 G/17G
17 POWDER, FOR SOLUTION ORAL
Qty: 240 | Refills: 0 | OUTPATIENT
Start: 2018-08-15 | End: 2018-08-28

## 2018-08-15 RX ORDER — EPROSARTAN MESYLATE 400 MG
1 TABLET ORAL
Qty: 0 | Refills: 0 | COMMUNITY

## 2018-08-15 RX ORDER — ACETAMINOPHEN 500 MG
2 TABLET ORAL
Qty: 0 | Refills: 0 | COMMUNITY
Start: 2018-08-15

## 2018-08-15 RX ORDER — METOPROLOL TARTRATE 50 MG
1 TABLET ORAL
Qty: 30 | Refills: 0 | OUTPATIENT
Start: 2018-08-15 | End: 2018-09-13

## 2018-08-15 RX ORDER — ASPIRIN/CALCIUM CARB/MAGNESIUM 324 MG
1 TABLET ORAL
Qty: 30 | Refills: 0 | OUTPATIENT
Start: 2018-08-15 | End: 2018-09-13

## 2018-08-15 RX ORDER — DOCUSATE SODIUM 100 MG
1 CAPSULE ORAL
Qty: 90 | Refills: 0 | OUTPATIENT
Start: 2018-08-15 | End: 2018-09-13

## 2018-08-15 RX ORDER — ASPIRIN/CALCIUM CARB/MAGNESIUM 324 MG
81 TABLET ORAL DAILY
Qty: 0 | Refills: 0 | Status: DISCONTINUED | OUTPATIENT
Start: 2018-08-15 | End: 2018-08-15

## 2018-08-15 RX ADMIN — PANTOPRAZOLE SODIUM 40 MILLIGRAM(S): 20 TABLET, DELAYED RELEASE ORAL at 06:39

## 2018-08-15 RX ADMIN — AMIODARONE HYDROCHLORIDE 200 MILLIGRAM(S): 400 TABLET ORAL at 06:39

## 2018-08-15 RX ADMIN — HEPARIN SODIUM 780 UNIT(S)/HR: 5000 INJECTION INTRAVENOUS; SUBCUTANEOUS at 03:21

## 2018-08-15 RX ADMIN — Medication 100 MILLIGRAM(S): at 06:39

## 2018-08-15 RX ADMIN — Medication 81 MILLIGRAM(S): at 11:07

## 2018-08-15 NOTE — PROGRESS NOTE ADULT - PROVIDER SPECIALTY LIST ADULT
CT Surgery
Cardiology
Critical Care
Critical Care
Cardiology
CT Surgery

## 2018-08-15 NOTE — PROGRESS NOTE ADULT - ASSESSMENT
1. Day 8 status post four-vessel coronary bypass surgery  2. Paroxysmal  fibrillationnow in normal sinus rhythm  3. No angina or CHF  4. Hemodynamically stable    Recommendati  Continue amiodarone  continue Lopressor,  start eliquis 5 BID  discharge today  followup with me in 1 week

## 2018-08-15 NOTE — PROGRESS NOTE ADULT - SUBJECTIVE AND OBJECTIVE BOX
Subjective: Patient seen and examined. No new events except as noted.   PAF now in NSR  switched to eliquis  on amio beta blocker   no cp or sob  MEDICATIONS:  MEDICATIONS  (STANDING):  amiodarone    Tablet 200 milliGRAM(s) Oral daily  apixaban 5 milliGRAM(s) Oral every 12 hours  aspirin enteric coated 81 milliGRAM(s) Oral daily  atorvastatin 40 milliGRAM(s) Oral at bedtime  docusate sodium 100 milliGRAM(s) Oral three times a day  metoprolol succinate  milliGRAM(s) Oral daily  pantoprazole    Tablet 40 milliGRAM(s) Oral before breakfast  polyethylene glycol 3350 17 Gram(s) Oral daily  sodium chloride 0.9%. 1000 milliLiter(s) (10 mL/Hr) IV Continuous <Continuous>      PHYSICAL EXAM:  T(C): 36.8 (08-15-18 @ 05:31), Max: 36.8 (08-14-18 @ 14:04)  HR: 69 (08-15-18 @ 05:31) (69 - 122)  BP: 116/68 (08-15-18 @ 05:31) (109/69 - 124/67)  RR: 18 (08-15-18 @ 05:31) (17 - 18)  SpO2: 97% (08-15-18 @ 05:31) (97% - 98%)  Wt(kg): --  I&O's Summary    14 Aug 2018 07:01  -  15 Aug 2018 07:00  --------------------------------------------------------  IN: 1352.8 mL / OUT: 300 mL / NET: 1052.8 mL          Appearance: Normal anxious to go home	  HEENT:   Normal oral mucosa, PERRL, EOMI	  Cardiovascular: Normal S1 S2, No JVD, No murmurs ,  Respiratory: Lungs clear to auscultation, normal effort 	  Gastrointestinal:  Soft, Non-tender, + BS	  Skin: No rashes, No ecchymoses, No cyanosis, warm to touch  Musculoskeletal: Normal range of motion, normal strength  Psychiatry:  Mood & affect appropriate  Ext: No edema  Peripheral pulses palpable 2+ bilaterally      LABS:    CARDIAC MARKERS:                                10.5   10.1  )-----------( 368      ( 15 Aug 2018 07:57 )             31.8     08-15    138  |  101  |  14  ----------------------------<  121<H>  4.3   |  24  |  1.14    Ca    8.8      15 Aug 2018 07:57

## 2018-08-17 RX ORDER — ASPIRIN ENTERIC COATED TABLETS 81 MG 81 MG/1
81 TABLET, DELAYED RELEASE ORAL DAILY
Refills: 0 | Status: ACTIVE | COMMUNITY

## 2018-08-17 RX ORDER — PANTOPRAZOLE SODIUM 40 MG/1
40 TABLET, DELAYED RELEASE ORAL DAILY
Refills: 0 | Status: ACTIVE | COMMUNITY

## 2018-08-17 RX ORDER — SIMVASTATIN 20 MG/1
20 TABLET, FILM COATED ORAL
Refills: 0 | Status: ACTIVE | COMMUNITY

## 2018-09-04 PROBLEM — Z78.9 ALCOHOL USE: Status: ACTIVE | Noted: 2018-09-04

## 2018-09-04 PROBLEM — I25.10 CAD, MULTIPLE VESSEL: Status: ACTIVE | Noted: 2018-09-04

## 2018-09-04 PROBLEM — Z87.891 FORMER SMOKER: Status: ACTIVE | Noted: 2018-09-04

## 2018-09-05 ENCOUNTER — APPOINTMENT (OUTPATIENT)
Dept: CARDIOTHORACIC SURGERY | Facility: CLINIC | Age: 73
End: 2018-09-05
Payer: MEDICARE

## 2018-09-05 VITALS
HEART RATE: 62 BPM | HEIGHT: 69 IN | TEMPERATURE: 97.6 F | RESPIRATION RATE: 14 BRPM | OXYGEN SATURATION: 100 % | SYSTOLIC BLOOD PRESSURE: 181 MMHG | DIASTOLIC BLOOD PRESSURE: 86 MMHG | BODY MASS INDEX: 23.7 KG/M2 | WEIGHT: 160 LBS

## 2018-09-05 DIAGNOSIS — Z87.891 PERSONAL HISTORY OF NICOTINE DEPENDENCE: ICD-10-CM

## 2018-09-05 DIAGNOSIS — Z78.9 OTHER SPECIFIED HEALTH STATUS: ICD-10-CM

## 2018-09-05 DIAGNOSIS — I25.10 ATHEROSCLEROTIC HEART DISEASE OF NATIVE CORONARY ARTERY W/OUT ANGINA PECTORIS: ICD-10-CM

## 2018-09-05 PROCEDURE — 99024 POSTOP FOLLOW-UP VISIT: CPT

## 2018-09-05 RX ORDER — DOCUSATE SODIUM 100 MG/1
100 CAPSULE ORAL 3 TIMES DAILY
Refills: 0 | Status: COMPLETED | COMMUNITY
End: 2018-09-05

## 2018-09-05 RX ORDER — ACETAMINOPHEN 325 MG/1
325 TABLET, FILM COATED ORAL
Refills: 0 | Status: COMPLETED | COMMUNITY
End: 2018-09-05

## 2018-09-05 RX ORDER — LORATADINE 10 MG
17 TABLET,DISINTEGRATING ORAL
Refills: 0 | Status: COMPLETED | COMMUNITY
End: 2018-09-05

## 2018-10-24 PROCEDURE — 82947 ASSAY GLUCOSE BLOOD QUANT: CPT

## 2018-10-24 PROCEDURE — C1751: CPT

## 2018-10-24 PROCEDURE — 80053 COMPREHEN METABOLIC PANEL: CPT

## 2018-10-24 PROCEDURE — 86900 BLOOD TYPING SEROLOGIC ABO: CPT

## 2018-10-24 PROCEDURE — 71045 X-RAY EXAM CHEST 1 VIEW: CPT

## 2018-10-24 PROCEDURE — C8929: CPT

## 2018-10-24 PROCEDURE — 82803 BLOOD GASES ANY COMBINATION: CPT

## 2018-10-24 PROCEDURE — 86891 AUTOLOGOUS BLOOD OP SALVAGE: CPT

## 2018-10-24 PROCEDURE — P9016: CPT

## 2018-10-24 PROCEDURE — 80076 HEPATIC FUNCTION PANEL: CPT

## 2018-10-24 PROCEDURE — 85576 BLOOD PLATELET AGGREGATION: CPT

## 2018-10-24 PROCEDURE — 83036 HEMOGLOBIN GLYCOSYLATED A1C: CPT

## 2018-10-24 PROCEDURE — 94002 VENT MGMT INPAT INIT DAY: CPT

## 2018-10-24 PROCEDURE — 86901 BLOOD TYPING SEROLOGIC RH(D): CPT

## 2018-10-24 PROCEDURE — 87641 MR-STAPH DNA AMP PROBE: CPT

## 2018-10-24 PROCEDURE — C1894: CPT

## 2018-10-24 PROCEDURE — 84100 ASSAY OF PHOSPHORUS: CPT

## 2018-10-24 PROCEDURE — 82330 ASSAY OF CALCIUM: CPT

## 2018-10-24 PROCEDURE — 84295 ASSAY OF SERUM SODIUM: CPT

## 2018-10-24 PROCEDURE — 85384 FIBRINOGEN ACTIVITY: CPT

## 2018-10-24 PROCEDURE — C1887: CPT

## 2018-10-24 PROCEDURE — 82553 CREATINE MB FRACTION: CPT

## 2018-10-24 PROCEDURE — 80048 BASIC METABOLIC PNL TOTAL CA: CPT

## 2018-10-24 PROCEDURE — 93458 L HRT ARTERY/VENTRICLE ANGIO: CPT

## 2018-10-24 PROCEDURE — 85014 HEMATOCRIT: CPT

## 2018-10-24 PROCEDURE — 36430 TRANSFUSION BLD/BLD COMPNT: CPT

## 2018-10-24 PROCEDURE — 93005 ELECTROCARDIOGRAM TRACING: CPT

## 2018-10-24 PROCEDURE — 82435 ASSAY OF BLOOD CHLORIDE: CPT

## 2018-10-24 PROCEDURE — 82550 ASSAY OF CK (CPK): CPT

## 2018-10-24 PROCEDURE — 87640 STAPH A DNA AMP PROBE: CPT

## 2018-10-24 PROCEDURE — 81003 URINALYSIS AUTO W/O SCOPE: CPT

## 2018-10-24 PROCEDURE — 99152 MOD SED SAME PHYS/QHP 5/>YRS: CPT

## 2018-10-24 PROCEDURE — 85730 THROMBOPLASTIN TIME PARTIAL: CPT

## 2018-10-24 PROCEDURE — 97116 GAIT TRAINING THERAPY: CPT

## 2018-10-24 PROCEDURE — P9047: CPT

## 2018-10-24 PROCEDURE — P9045: CPT

## 2018-10-24 PROCEDURE — 84443 ASSAY THYROID STIM HORMONE: CPT

## 2018-10-24 PROCEDURE — 97162 PT EVAL MOD COMPLEX 30 MIN: CPT

## 2018-10-24 PROCEDURE — C1769: CPT

## 2018-10-24 PROCEDURE — 85027 COMPLETE CBC AUTOMATED: CPT

## 2018-10-24 PROCEDURE — 86850 RBC ANTIBODY SCREEN: CPT

## 2018-10-24 PROCEDURE — 85610 PROTHROMBIN TIME: CPT

## 2018-10-24 PROCEDURE — 86923 COMPATIBILITY TEST ELECTRIC: CPT

## 2018-10-24 PROCEDURE — C1889: CPT

## 2018-10-24 PROCEDURE — 99153 MOD SED SAME PHYS/QHP EA: CPT

## 2018-10-24 PROCEDURE — 83605 ASSAY OF LACTIC ACID: CPT

## 2018-10-24 PROCEDURE — 83735 ASSAY OF MAGNESIUM: CPT

## 2018-10-24 PROCEDURE — 94010 BREATHING CAPACITY TEST: CPT

## 2018-10-24 PROCEDURE — 84484 ASSAY OF TROPONIN QUANT: CPT

## 2018-10-24 PROCEDURE — 97530 THERAPEUTIC ACTIVITIES: CPT

## 2018-10-24 PROCEDURE — 84132 ASSAY OF SERUM POTASSIUM: CPT

## 2018-10-24 PROCEDURE — 82962 GLUCOSE BLOOD TEST: CPT

## 2018-10-24 PROCEDURE — 93880 EXTRACRANIAL BILAT STUDY: CPT

## 2018-10-27 ENCOUNTER — TRANSCRIPTION ENCOUNTER (OUTPATIENT)
Age: 73
End: 2018-10-27

## 2019-07-08 NOTE — PATIENT PROFILE ADULT. - NS TRANSFER PATIENT BELONGINGS
[General Appearance - Well Developed] : well developed [Normal Appearance] : normal appearance Cell Phone/PDA (specify)/Clothing [Well Groomed] : well groomed [General Appearance - Well Nourished] : well nourished [No Deformities] : no deformities [General Appearance - In No Acute Distress] : no acute distress [Normal Conjunctiva] : the conjunctiva exhibited no abnormalities [Eyelids - No Xanthelasma] : the eyelids demonstrated no xanthelasmas [Normal Oral Mucosa] : normal oral mucosa [No Oral Pallor] : no oral pallor [No Oral Cyanosis] : no oral cyanosis [Normal Jugular Venous A Waves Present] : normal jugular venous A waves present [No Jugular Venous Barrios A Waves] : no jugular venous barrios A waves [Normal Jugular Venous V Waves Present] : normal jugular venous V waves present [Exaggerated Use Of Accessory Muscles For Inspiration] : no accessory muscle use [Respiration, Rhythm And Depth] : normal respiratory rhythm and effort [Heart Rate And Rhythm] : heart rate and rhythm were normal [Auscultation Breath Sounds / Voice Sounds] : lungs were clear to auscultation bilaterally [Murmurs] : no murmurs present [Heart Sounds] : normal S1 and S2 [Abdomen Soft] : soft [Abdomen Tenderness] : non-tender [Abdomen Mass (___ Cm)] : no abdominal mass palpated [Abnormal Walk] : normal gait [Gait - Sufficient For Exercise Testing] : the gait was sufficient for exercise testing [Nail Clubbing] : no clubbing of the fingernails [Cyanosis, Localized] : no localized cyanosis [Petechial Hemorrhages (___cm)] : no petechial hemorrhages [] : no ischemic changes

## 2021-01-21 ENCOUNTER — TRANSCRIPTION ENCOUNTER (OUTPATIENT)
Age: 76
End: 2021-01-21

## 2021-02-10 NOTE — PROGRESS NOTE ADULT - PROBLEM SELECTOR PROBLEM 3
76
Hypertension, unspecified type

## 2021-07-01 ENCOUNTER — RX RENEWAL (OUTPATIENT)
Age: 76
End: 2021-07-01

## 2021-08-09 ENCOUNTER — TRANSCRIPTION ENCOUNTER (OUTPATIENT)
Age: 76
End: 2021-08-09

## 2021-08-11 ENCOUNTER — EMERGENCY (EMERGENCY)
Facility: HOSPITAL | Age: 76
LOS: 0 days | Discharge: HOME | End: 2021-08-11
Attending: EMERGENCY MEDICINE
Payer: MEDICARE

## 2021-08-11 VITALS
RESPIRATION RATE: 18 BRPM | SYSTOLIC BLOOD PRESSURE: 153 MMHG | TEMPERATURE: 98 F | DIASTOLIC BLOOD PRESSURE: 77 MMHG | OXYGEN SATURATION: 97 % | HEART RATE: 49 BPM

## 2021-08-11 VITALS — WEIGHT: 154.98 LBS | HEIGHT: 67.99 IN

## 2021-08-11 DIAGNOSIS — R00.2 PALPITATIONS: ICD-10-CM

## 2021-08-11 DIAGNOSIS — Z20.822 CONTACT WITH AND (SUSPECTED) EXPOSURE TO COVID-19: ICD-10-CM

## 2021-08-11 DIAGNOSIS — Z79.82 LONG TERM (CURRENT) USE OF ASPIRIN: ICD-10-CM

## 2021-08-11 DIAGNOSIS — R42 DIZZINESS AND GIDDINESS: ICD-10-CM

## 2021-08-11 DIAGNOSIS — I10 ESSENTIAL (PRIMARY) HYPERTENSION: ICD-10-CM

## 2021-08-11 LAB
APPEARANCE UR: CLEAR — SIGNIFICANT CHANGE UP
BASOPHILS # BLD AUTO: 0.04 K/UL — SIGNIFICANT CHANGE UP (ref 0–0.2)
BASOPHILS NFR BLD AUTO: 0.7 % — SIGNIFICANT CHANGE UP (ref 0–2)
BILIRUB UR-MCNC: NEGATIVE — SIGNIFICANT CHANGE UP
COLOR SPEC: YELLOW — SIGNIFICANT CHANGE UP
DIFF PNL FLD: NEGATIVE — SIGNIFICANT CHANGE UP
EOSINOPHIL # BLD AUTO: 0.25 K/UL — SIGNIFICANT CHANGE UP (ref 0–0.5)
EOSINOPHIL NFR BLD AUTO: 4.5 % — SIGNIFICANT CHANGE UP (ref 0–6)
GLUCOSE UR QL: NEGATIVE MG/DL — SIGNIFICANT CHANGE UP
HCT VFR BLD CALC: 42.4 % — SIGNIFICANT CHANGE UP (ref 39–50)
HGB BLD-MCNC: 14.2 G/DL — SIGNIFICANT CHANGE UP (ref 13–17)
IMM GRANULOCYTES NFR BLD AUTO: 0.2 % — SIGNIFICANT CHANGE UP (ref 0–1.5)
KETONES UR-MCNC: NEGATIVE — SIGNIFICANT CHANGE UP
LEUKOCYTE ESTERASE UR-ACNC: NEGATIVE — SIGNIFICANT CHANGE UP
LYMPHOCYTES # BLD AUTO: 1.64 K/UL — SIGNIFICANT CHANGE UP (ref 1–3.3)
LYMPHOCYTES # BLD AUTO: 29.3 % — SIGNIFICANT CHANGE UP (ref 13–44)
MCHC RBC-ENTMCNC: 32.3 PG — SIGNIFICANT CHANGE UP (ref 27–34)
MCHC RBC-ENTMCNC: 33.5 GM/DL — SIGNIFICANT CHANGE UP (ref 32–36)
MCV RBC AUTO: 96.4 FL — SIGNIFICANT CHANGE UP (ref 80–100)
MONOCYTES # BLD AUTO: 0.81 K/UL — SIGNIFICANT CHANGE UP (ref 0–0.9)
MONOCYTES NFR BLD AUTO: 14.5 % — HIGH (ref 2–14)
NEUTROPHILS # BLD AUTO: 2.84 K/UL — SIGNIFICANT CHANGE UP (ref 1.8–7.4)
NEUTROPHILS NFR BLD AUTO: 50.8 % — SIGNIFICANT CHANGE UP (ref 43–77)
NITRITE UR-MCNC: NEGATIVE — SIGNIFICANT CHANGE UP
PH UR: 6 — SIGNIFICANT CHANGE UP (ref 5–8)
PLATELET # BLD AUTO: 166 K/UL — SIGNIFICANT CHANGE UP (ref 150–400)
PROT UR-MCNC: NEGATIVE MG/DL — SIGNIFICANT CHANGE UP
RBC # BLD: 4.4 M/UL — SIGNIFICANT CHANGE UP (ref 4.2–5.8)
RBC # FLD: 12.8 % — SIGNIFICANT CHANGE UP (ref 10.3–14.5)
SP GR SPEC: 1 — LOW (ref 1.01–1.02)
TROPONIN I SERPL-MCNC: <0.015 NG/ML — SIGNIFICANT CHANGE UP (ref 0.01–0.04)
UROBILINOGEN FLD QL: NEGATIVE MG/DL — SIGNIFICANT CHANGE UP
WBC # BLD: 5.59 K/UL — SIGNIFICANT CHANGE UP (ref 3.8–10.5)
WBC # FLD AUTO: 5.59 K/UL — SIGNIFICANT CHANGE UP (ref 3.8–10.5)

## 2021-08-11 PROCEDURE — 83735 ASSAY OF MAGNESIUM: CPT

## 2021-08-11 PROCEDURE — 70450 CT HEAD/BRAIN W/O DYE: CPT | Mod: 26,MA

## 2021-08-11 PROCEDURE — 36415 COLL VENOUS BLD VENIPUNCTURE: CPT

## 2021-08-11 PROCEDURE — 99285 EMERGENCY DEPT VISIT HI MDM: CPT

## 2021-08-11 PROCEDURE — U0003: CPT

## 2021-08-11 PROCEDURE — U0005: CPT

## 2021-08-11 PROCEDURE — 96361 HYDRATE IV INFUSION ADD-ON: CPT

## 2021-08-11 PROCEDURE — 71045 X-RAY EXAM CHEST 1 VIEW: CPT | Mod: 26

## 2021-08-11 PROCEDURE — 93005 ELECTROCARDIOGRAM TRACING: CPT

## 2021-08-11 PROCEDURE — 85730 THROMBOPLASTIN TIME PARTIAL: CPT

## 2021-08-11 PROCEDURE — 85025 COMPLETE CBC W/AUTO DIFF WBC: CPT

## 2021-08-11 PROCEDURE — 80053 COMPREHEN METABOLIC PANEL: CPT

## 2021-08-11 PROCEDURE — 96360 HYDRATION IV INFUSION INIT: CPT

## 2021-08-11 PROCEDURE — 81003 URINALYSIS AUTO W/O SCOPE: CPT

## 2021-08-11 PROCEDURE — 71045 X-RAY EXAM CHEST 1 VIEW: CPT

## 2021-08-11 PROCEDURE — 85610 PROTHROMBIN TIME: CPT

## 2021-08-11 PROCEDURE — 93010 ELECTROCARDIOGRAM REPORT: CPT

## 2021-08-11 PROCEDURE — 83880 ASSAY OF NATRIURETIC PEPTIDE: CPT

## 2021-08-11 PROCEDURE — 70450 CT HEAD/BRAIN W/O DYE: CPT

## 2021-08-11 PROCEDURE — 84484 ASSAY OF TROPONIN QUANT: CPT

## 2021-08-11 PROCEDURE — 99284 EMERGENCY DEPT VISIT MOD MDM: CPT | Mod: 25

## 2021-08-11 RX ORDER — SODIUM CHLORIDE 9 MG/ML
500 INJECTION INTRAMUSCULAR; INTRAVENOUS; SUBCUTANEOUS ONCE
Refills: 0 | Status: COMPLETED | OUTPATIENT
Start: 2021-08-11 | End: 2021-08-11

## 2021-08-11 RX ADMIN — SODIUM CHLORIDE 500 MILLILITER(S): 9 INJECTION INTRAMUSCULAR; INTRAVENOUS; SUBCUTANEOUS at 15:33

## 2021-08-11 RX ADMIN — SODIUM CHLORIDE 500 MILLILITER(S): 9 INJECTION INTRAMUSCULAR; INTRAVENOUS; SUBCUTANEOUS at 12:03

## 2021-08-11 NOTE — ED ADULT TRIAGE NOTE - CHIEF COMPLAINT QUOTE
c/o palpitations and dizziness started 2 days ago, denies sob, fever or cough, O2 sat in triage 96% on RA, pulse 69 HX: CABG x 4 vessels 4 yrs ago, patients states he fell out of bed and hit head on wall 2 weeks ago. denies LOC, takes 81mg ASA daily

## 2021-08-11 NOTE — ED ADULT NURSE NOTE - NS_ED_NURSE_TEACHING_TOPIC_ED_A_ED
Pt. denies any physical distress at this time- Pt. verbalizes understanding of DC, FU and Worsening of symptoms/Cardiac

## 2021-08-11 NOTE — ED PROVIDER NOTE - PATIENT PORTAL LINK FT
You can access the FollowMyHealth Patient Portal offered by [facility] by registering at the following website: [email or web address]. By joining [facility]’s FollowMyHealth portal, you will also be able to view your health information using other applications (apps) compatible with our system.

## 2021-08-11 NOTE — ED ADULT NURSE NOTE - OBJECTIVE STATEMENT
Pt comes to ED with c/o palpitations and dizziness that started 2 days ago. Pt states episode occurred after walking up the stairs. As per pt, pt started having increased dizziness after falling out of bed and hitting his head. Denies LOC at that time. pt did not seek medical attention.

## 2021-08-11 NOTE — ED PROVIDER NOTE - NS_ ATTENDINGSCRIBEDETAILS _ED_A_ED_FT
I, Porter Simon MD,  performed the initial face to face bedside interview with this patient regarding history of present illness, review of symptoms and relevant past medical, social and family history.  I completed an independent physical examination.  I was the initial provider who evaluated this patient.  The history, relevant review of systems, past medical and surgical history, medical decision making, and physical examination was documented by the scribe in my presence and I attest to the accuracy of the documentation.

## 2021-08-11 NOTE — ED PROVIDER NOTE - PHYSICAL EXAMINATION
Constitutional: NAD AAOx3  Eyes: PERRLA EOMI  Head: Normocephalic atraumatic  Mouth: MMM  Cardiac: regular rate   Resp: Lungs CTAB  GI: Abd s/nt/nd  Neuro: CN2-12 intact, normal strength, sensation, coordination nih of 0  Skin: No rashes Constitutional: NAD AAOx3  Eyes: PERRLA EOMI  Head: Normocephalic atraumatic  Mouth: MMM  Cardiac: regular rate normal peripheral pulses no LE swelling  Resp: Lungs CTAB  GI: Abd s/nt/nd  Neuro: CN2-12 intact, normal strength, sensation, coordination nih of 0  Skin: No rashes

## 2021-08-11 NOTE — ED PROVIDER NOTE - OBJECTIVE STATEMENT
75 y/o male with a PMHx of HLD, HTN, CABG 3 years ago presents to ED c/o palpitations x couple of days. +associated mild dizziness.  Pt fell 2 weeks ago off the bed hit his head on the wall, did not seek medical attention at the time. No hx of similar palpitations before. Pt stopped taking Lisinopril recently and started taking it again a few days ago. Pt denies sob, chest pain, numbness. Pt currently not on anticoagulants but is on Asprin 81 mg. Cardio : Dr. Haney

## 2021-08-11 NOTE — ED PROVIDER NOTE - PROGRESS NOTE DETAILS
trop negative x 2. pt feeling much better asymptomatic no tele events infrequent PVCs. pt wants to go home. has cardiologist appointment tomorrow. will d/c with strict return precautions. Porter Simon M.D., Attending Physician

## 2021-08-11 NOTE — ED PROVIDER NOTE - CLINICAL SUMMARY MEDICAL DECISION MAKING FREE TEXT BOX
75 y/o male with PMHx of HTN, HLD, bypass surgery,  presents with palpitations, mild dizziness. Currently with minimal sx. Exam non focal. Will check electrolytes, r/o acs, and reassess 75 y/o male with PMHx of HTN, HLD, bypass surgery,  presents with palpitations, mild dizziness. Currently with minimal sx. Exam non focal. Will check electrolytes, r/o acs, no signs or concerns for CVA> likely component of dehydration. and reassess

## 2021-08-11 NOTE — ED PROVIDER NOTE - NSFOLLOWUPINSTRUCTIONS_ED_ALL_ED_FT
1. return for worsening symptoms or anything concerning to you  2. take all home meds as prescribed  3. follow up with your pmd call to make an appointment  4. follow up with your cardiologist tomorrow    Heart Palpitations    WHAT YOU NEED TO KNOW:    Heart palpitations are feelings that your heart races, jumps, throbs, or flutters. You may feel extra beats, no beats for a short time, or skipped beats. You may have these feelings in your chest, throat, or neck. They may happen when you are sitting, standing, or lying. Heart palpitations may be frightening, but are usually not caused by a serious problem.     DISCHARGE INSTRUCTIONS:    Call 911 or have someone else call for any of the following:     You have any of the following signs of a heart attack:   Squeezing, pressure, or pain in your chest       and any of the following:   Discomfort or pain in your back, neck, jaw, stomach, or arm       Shortness of breath      Nausea or vomiting      Lightheadedness or a sudden cold sweat      You have any of the following signs of a stroke:   Numbness or drooping on one side of your face       Weakness in an arm or leg      Confusion or difficulty speaking      Dizziness, a severe headache, or vision loss      You faint or lose consciousness.     Return to the emergency department if:     Your palpitations happen more often or get more intense.         Contact your healthcare provider if:     You have new or worsening swelling in your feet or ankles.      You have questions or concerns about your condition or care.    Follow up with your healthcare provider as directed: You may need to follow up with a cardiologist. You may need tests to check for heart problems that cause palpitations. Write down your questions so you remember to ask them during your visits.     Keep a record: Write down when your palpitations start and stop, what you were doing when they started, and your symptoms. Keep track of what you ate or drank within a few hours of your palpitations. Include anything that seemed to help your symptoms, such as lying down or holding your breath. This record will help you and your healthcare provider learn what triggers your palpitations. Bring this record with you to your follow up visits.    Help prevent heart palpitations:     Manage stress and anxiety. Find ways to relax such as listening to music, meditating, or doing yoga. Exercise can also help decrease stress and anxiety. Talk to someone you trust about your stress or anxiety. You can also talk to a therapist.       Get plenty of sleep every night. Ask your healthcare provider how much sleep you need each night.       Do not drink caffeine or alcohol. Caffeine and alcohol can make your palpitations worse. Caffeine is found in soda, coffee, tea, chocolate, and drinks that increase your energy.       Do not smoke. Nicotine and other chemicals in cigarettes and cigars may damage your heart and blood vessels. Ask your healthcare provider for information if you currently smoke and need help to quit. E-cigarettes or smokeless tobacco still contain nicotine. Talk to your healthcare provider before you use these products.       Do not use illegal drugs. Talk to your healthcare provider if you use illegal drugs and want help to quit.

## 2021-08-11 NOTE — ED PROVIDER NOTE - NS ED ROS FT
Constitutional: No fever or chills  Eyes: No visual changes  HEENT: No throat pain  CV: No chest pain, +palpitations,   Resp: No SOB no cough  GI: No abd pain, nausea or vomiting  : No dysuria  MSK: No musculoskeletal pain  Skin: No rash  Neuro: No headache +dizziness

## 2021-08-12 ENCOUNTER — NON-APPOINTMENT (OUTPATIENT)
Age: 76
End: 2021-08-12

## 2021-08-12 ENCOUNTER — APPOINTMENT (OUTPATIENT)
Dept: CARDIOLOGY | Facility: CLINIC | Age: 76
End: 2021-08-12
Payer: MEDICARE

## 2021-08-12 VITALS
OXYGEN SATURATION: 97 % | WEIGHT: 163 LBS | BODY MASS INDEX: 24.14 KG/M2 | SYSTOLIC BLOOD PRESSURE: 160 MMHG | HEART RATE: 58 BPM | DIASTOLIC BLOOD PRESSURE: 80 MMHG | HEIGHT: 69 IN

## 2021-08-12 DIAGNOSIS — Z95.1 PRESENCE OF AORTOCORONARY BYPASS GRAFT: ICD-10-CM

## 2021-08-12 DIAGNOSIS — I10 ESSENTIAL (PRIMARY) HYPERTENSION: ICD-10-CM

## 2021-08-12 DIAGNOSIS — R42 DIZZINESS AND GIDDINESS: ICD-10-CM

## 2021-08-12 PROCEDURE — 93000 ELECTROCARDIOGRAM COMPLETE: CPT

## 2021-08-12 PROCEDURE — 99214 OFFICE O/P EST MOD 30 MIN: CPT

## 2021-08-12 PROCEDURE — 99204 OFFICE O/P NEW MOD 45 MIN: CPT

## 2021-08-12 RX ORDER — APIXABAN 5 MG/1
5 TABLET, FILM COATED ORAL TWICE DAILY
Refills: 0 | Status: DISCONTINUED | COMMUNITY
End: 2021-08-12

## 2021-08-12 RX ORDER — AMIODARONE HYDROCHLORIDE 200 MG/1
200 TABLET ORAL DAILY
Refills: 0 | Status: DISCONTINUED | COMMUNITY
End: 2021-08-12

## 2021-08-12 RX ORDER — ATORVASTATIN CALCIUM 80 MG/1
80 TABLET, FILM COATED ORAL DAILY
Qty: 90 | Refills: 2 | Status: ACTIVE | COMMUNITY
Start: 2021-08-02 | End: 1900-01-01

## 2021-08-13 PROBLEM — I10 HYPERTENSION: Status: ACTIVE | Noted: 2018-09-04

## 2021-08-13 PROBLEM — Z95.1 S/P CABG X 4: Status: ACTIVE | Noted: 2018-09-04

## 2021-08-13 PROBLEM — R42 DIZZINESS: Status: ACTIVE | Noted: 2021-08-13

## 2021-08-13 NOTE — REASON FOR VISIT
[FreeTextEntry1] : Harris Hodges 76 years old known to me from my prior office here for reevaluation of his cardiac status

## 2021-08-13 NOTE — HISTORY OF PRESENT ILLNESS
[FreeTextEntry1] : Harris is 76 years old with a history of hyperlipidemia and hypertension.  He presented in 2018 with unstable angina.  He underwent cardiac catheterization which revealed normal left ventricular function with complex coronary artery disease.  After long discussions of stenting versus coronary bypass surgery, it was decided that he should undergo coronary bypass surgery which he underwent successfully with Dr. Tamayo.  His postoperative course was complicated by atrial fibrillation but ultimately he stabilized and has been stable now for several years\par \par He has been on stable medications for his hypertension.\par \par I have not seen him for over a year and a half since COVID-19\par \par He recently fell out of bed probably as a result of a dream no dizziness no syncope.  He had his head.  For a few days he was feeling dizzy had a headache.  Ultimately the symptoms continued and he went to the emergency room where he had a complete evaluation including EKG which was unremarkable, blood tests which were unremarkable and a CT of his head which did not reveal any intracerebral issues no evidence of subarachnoid etc.\par \par Since then he has had occasional dizziness with change in position.\par \par He is a somewhat fluctuating blood pressure sometimes low and sometimes a little on the high side.  He complains of a mild cough for some time without productive sputum.\par \par He denies exertional chest pain or chest pressure.  He has good exercise tolerance.  Since I last saw him, he has lost a significant amount of weight but apparently he does have a good appetite\par \par EKG reveals sinus bradycardia otherwise within normal limits

## 2021-08-13 NOTE — DISCUSSION/SUMMARY
[FreeTextEntry1] : 1.  Consider changing his lisinopril to an ARB for the cough\par 2.  Continue other medications\par 3.  Encouraged exercise, salt reduction\par 4.  Follow-up with Dr. Carrillo for general medical care and routine blood tests\par \par Routine follow with me again in 3 months

## 2021-08-13 NOTE — ASSESSMENT
[FreeTextEntry1] : Harris Hodges has chronic ischemic heart disease status post bypass surgery history of hypertension intermittent dizziness recent fall out of bed without syncope with normal CT the head normal EKG and normal blood tests.  He appears quite hemodynamically stable.  He does have a cough which could be related to the lisinopril.  Today his blood pressure is somewhat elevated

## 2021-08-13 NOTE — PHYSICAL EXAM
[Well Developed] : well developed [Well Nourished] : well nourished [No Acute Distress] : no acute distress [Normal Conjunctiva] : normal conjunctiva [No Carotid Bruit] : no carotid bruit [Normal S1, S2] : normal S1, S2 [No Murmur] : no murmur [Clear Lung Fields] : clear lung fields [Soft] : abdomen soft [Non Tender] : non-tender [No Edema] : no edema [Alert and Oriented] : alert and oriented

## 2021-10-01 ENCOUNTER — RX RENEWAL (OUTPATIENT)
Age: 76
End: 2021-10-01

## 2021-10-27 ENCOUNTER — RX RENEWAL (OUTPATIENT)
Age: 76
End: 2021-10-27

## 2021-10-27 RX ORDER — LISINOPRIL 10 MG/1
10 TABLET ORAL
Qty: 30 | Refills: 2 | Status: ACTIVE | COMMUNITY
Start: 2021-08-02 | End: 1900-01-01

## 2021-12-27 ENCOUNTER — RX RENEWAL (OUTPATIENT)
Age: 76
End: 2021-12-27

## 2021-12-27 RX ORDER — METOPROLOL SUCCINATE 100 MG/1
100 TABLET, EXTENDED RELEASE ORAL
Qty: 30 | Refills: 2 | Status: ACTIVE | COMMUNITY
Start: 2021-07-01 | End: 1900-01-01

## 2023-12-09 NOTE — ED ADULT NURSE NOTE - NS ED NURSE RECORD ANOTHER HT AND WT
"PRIMARY DIAGNOSIS: \"GENERIC\" NURSING  OUTPATIENT/OBSERVATION GOALS TO BE MET BEFORE DISCHARGE:  ADLs back to baseline: Yes    Activity and level of assistance: Ambulating independently.    Pain status: Pain free.    Return to near baseline physical activity: Yes     Discharge Planner Nurse   Safe discharge environment identified: No  Barriers to discharge: Yes, timed imaging needed, pt pulled out NG tube intentionally after having a very large BM.       Entered by: Liane White RN 12/09/2023 8:22 AM     Please review provider order for any additional goals.   Nurse to notify provider when observation goals have been met and patient is ready for discharge.Goal Outcome Evaluation:                        " Yes

## 2024-12-07 ENCOUNTER — RESULT REVIEW (OUTPATIENT)
Age: 79
End: 2024-12-07

## 2024-12-07 ENCOUNTER — EMERGENCY (EMERGENCY)
Facility: HOSPITAL | Age: 79
LOS: 0 days | Discharge: HOME | End: 2024-12-08
Attending: STUDENT IN AN ORGANIZED HEALTH CARE EDUCATION/TRAINING PROGRAM
Payer: MEDICARE

## 2024-12-07 VITALS
HEART RATE: 88 BPM | OXYGEN SATURATION: 99 % | WEIGHT: 154.54 LBS | DIASTOLIC BLOOD PRESSURE: 92 MMHG | SYSTOLIC BLOOD PRESSURE: 148 MMHG | TEMPERATURE: 98 F | RESPIRATION RATE: 18 BRPM

## 2024-12-07 DIAGNOSIS — I34.0 NONRHEUMATIC MITRAL (VALVE) INSUFFICIENCY: ICD-10-CM

## 2024-12-07 DIAGNOSIS — S09.90XA UNSPECIFIED INJURY OF HEAD, INITIAL ENCOUNTER: ICD-10-CM

## 2024-12-07 DIAGNOSIS — I10 ESSENTIAL (PRIMARY) HYPERTENSION: ICD-10-CM

## 2024-12-07 DIAGNOSIS — E46 UNSPECIFIED PROTEIN-CALORIE MALNUTRITION: ICD-10-CM

## 2024-12-07 DIAGNOSIS — I25.10 ATHEROSCLEROTIC HEART DISEASE OF NATIVE CORONARY ARTERY WITHOUT ANGINA PECTORIS: ICD-10-CM

## 2024-12-07 DIAGNOSIS — R42 DIZZINESS AND GIDDINESS: ICD-10-CM
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
## 2024-12-07 DIAGNOSIS — R55 SYNCOPE AND COLLAPSE: ICD-10-CM

## 2024-12-07 DIAGNOSIS — I37.1 NONRHEUMATIC PULMONARY VALVE INSUFFICIENCY: ICD-10-CM

## 2024-12-07 DIAGNOSIS — I48.0 PAROXYSMAL ATRIAL FIBRILLATION: ICD-10-CM

## 2024-12-07 DIAGNOSIS — Z79.82 LONG TERM (CURRENT) USE OF ASPIRIN: ICD-10-CM

## 2024-12-07 DIAGNOSIS — Z95.1 PRESENCE OF AORTOCORONARY BYPASS GRAFT: ICD-10-CM

## 2024-12-07 DIAGNOSIS — Z79.01 LONG TERM (CURRENT) USE OF ANTICOAGULANTS: ICD-10-CM

## 2024-12-07 LAB
ALBUMIN SERPL ELPH-MCNC: 3.3 G/DL — SIGNIFICANT CHANGE UP (ref 3.3–5)
ALP SERPL-CCNC: 73 U/L — SIGNIFICANT CHANGE UP (ref 40–120)
ALT FLD-CCNC: 30 U/L — SIGNIFICANT CHANGE UP (ref 12–78)
ANION GAP SERPL CALC-SCNC: 3 MMOL/L — LOW (ref 5–17)
APPEARANCE UR: CLEAR — SIGNIFICANT CHANGE UP
APTT BLD: 27.1 SEC — SIGNIFICANT CHANGE UP (ref 24.5–35.6)
AST SERPL-CCNC: 31 U/L — SIGNIFICANT CHANGE UP (ref 15–37)
BASOPHILS # BLD AUTO: 0.05 K/UL — SIGNIFICANT CHANGE UP (ref 0–0.2)
BASOPHILS NFR BLD AUTO: 0.7 % — SIGNIFICANT CHANGE UP (ref 0–2)
BILIRUB SERPL-MCNC: 1 MG/DL — SIGNIFICANT CHANGE UP (ref 0.2–1.2)
BILIRUB UR-MCNC: NEGATIVE — SIGNIFICANT CHANGE UP
BUN SERPL-MCNC: 19 MG/DL — SIGNIFICANT CHANGE UP (ref 7–23)
CALCIUM SERPL-MCNC: 9.2 MG/DL — SIGNIFICANT CHANGE UP (ref 8.5–10.1)
CHLORIDE SERPL-SCNC: 111 MMOL/L — HIGH (ref 96–108)
CO2 SERPL-SCNC: 26 MMOL/L — SIGNIFICANT CHANGE UP (ref 22–31)
COLOR SPEC: YELLOW — SIGNIFICANT CHANGE UP
CREAT SERPL-MCNC: 1.12 MG/DL — SIGNIFICANT CHANGE UP (ref 0.5–1.3)
DIFF PNL FLD: NEGATIVE — SIGNIFICANT CHANGE UP
EGFR: 67 ML/MIN/1.73M2 — SIGNIFICANT CHANGE UP
EOSINOPHIL # BLD AUTO: 0.25 K/UL — SIGNIFICANT CHANGE UP (ref 0–0.5)
EOSINOPHIL NFR BLD AUTO: 3.4 % — SIGNIFICANT CHANGE UP (ref 0–6)
FLUAV AG NPH QL: SIGNIFICANT CHANGE UP
FLUBV AG NPH QL: SIGNIFICANT CHANGE UP
GLUCOSE SERPL-MCNC: 103 MG/DL — HIGH (ref 70–99)
GLUCOSE UR QL: NEGATIVE MG/DL — SIGNIFICANT CHANGE UP
HCT VFR BLD CALC: 44 % — SIGNIFICANT CHANGE UP (ref 39–50)
HGB BLD-MCNC: 14.5 G/DL — SIGNIFICANT CHANGE UP (ref 13–17)
IMM GRANULOCYTES NFR BLD AUTO: 0.3 % — SIGNIFICANT CHANGE UP (ref 0–0.9)
INR BLD: 0.99 RATIO — SIGNIFICANT CHANGE UP (ref 0.85–1.16)
KETONES UR-MCNC: 15 MG/DL
LEUKOCYTE ESTERASE UR-ACNC: NEGATIVE — SIGNIFICANT CHANGE UP
LYMPHOCYTES # BLD AUTO: 1.76 K/UL — SIGNIFICANT CHANGE UP (ref 1–3.3)
LYMPHOCYTES # BLD AUTO: 24.1 % — SIGNIFICANT CHANGE UP (ref 13–44)
MAGNESIUM SERPL-MCNC: 2.2 MG/DL — SIGNIFICANT CHANGE UP (ref 1.6–2.6)
MCHC RBC-ENTMCNC: 32.5 PG — SIGNIFICANT CHANGE UP (ref 27–34)
MCHC RBC-ENTMCNC: 33 G/DL — SIGNIFICANT CHANGE UP (ref 32–36)
MCV RBC AUTO: 98.7 FL — SIGNIFICANT CHANGE UP (ref 80–100)
MONOCYTES # BLD AUTO: 1.21 K/UL — HIGH (ref 0–0.9)
MONOCYTES NFR BLD AUTO: 16.6 % — HIGH (ref 2–14)
NEUTROPHILS # BLD AUTO: 4.02 K/UL — SIGNIFICANT CHANGE UP (ref 1.8–7.4)
NEUTROPHILS NFR BLD AUTO: 54.9 % — SIGNIFICANT CHANGE UP (ref 43–77)
NITRITE UR-MCNC: NEGATIVE — SIGNIFICANT CHANGE UP
NT-PROBNP SERPL-SCNC: 701 PG/ML — HIGH (ref 0–450)
PH UR: 7.5 — SIGNIFICANT CHANGE UP (ref 5–8)
PLATELET # BLD AUTO: 158 K/UL — SIGNIFICANT CHANGE UP (ref 150–400)
POTASSIUM SERPL-MCNC: 4.1 MMOL/L — SIGNIFICANT CHANGE UP (ref 3.5–5.3)
POTASSIUM SERPL-SCNC: 4.1 MMOL/L — SIGNIFICANT CHANGE UP (ref 3.5–5.3)
PROT SERPL-MCNC: 6.8 GM/DL — SIGNIFICANT CHANGE UP (ref 6–8.3)
PROT UR-MCNC: NEGATIVE MG/DL — SIGNIFICANT CHANGE UP
PROTHROM AB SERPL-ACNC: 11.4 SEC — SIGNIFICANT CHANGE UP (ref 9.9–13.4)
RBC # BLD: 4.46 M/UL — SIGNIFICANT CHANGE UP (ref 4.2–5.8)
RBC # FLD: 13 % — SIGNIFICANT CHANGE UP (ref 10.3–14.5)
RSV RNA NPH QL NAA+NON-PROBE: SIGNIFICANT CHANGE UP
SARS-COV-2 RNA SPEC QL NAA+PROBE: SIGNIFICANT CHANGE UP
SODIUM SERPL-SCNC: 140 MMOL/L — SIGNIFICANT CHANGE UP (ref 135–145)
SP GR SPEC: 1.01 — SIGNIFICANT CHANGE UP (ref 1–1.03)
TROPONIN I, HIGH SENSITIVITY RESULT: 26.31 NG/L — SIGNIFICANT CHANGE UP
TROPONIN I, HIGH SENSITIVITY RESULT: 30.47 NG/L — SIGNIFICANT CHANGE UP
TSH SERPL-MCNC: 1.07 UU/ML — SIGNIFICANT CHANGE UP (ref 0.34–4.82)
UROBILINOGEN FLD QL: 0.2 MG/DL — SIGNIFICANT CHANGE UP (ref 0.2–1)
WBC # BLD: 7.31 K/UL — SIGNIFICANT CHANGE UP (ref 3.8–10.5)
WBC # FLD AUTO: 7.31 K/UL — SIGNIFICANT CHANGE UP (ref 3.8–10.5)

## 2024-12-07 PROCEDURE — 99285 EMERGENCY DEPT VISIT HI MDM: CPT | Mod: 25

## 2024-12-07 PROCEDURE — 93306 TTE W/DOPPLER COMPLETE: CPT | Mod: 26

## 2024-12-07 PROCEDURE — 70450 CT HEAD/BRAIN W/O DYE: CPT | Mod: MC

## 2024-12-07 PROCEDURE — 71045 X-RAY EXAM CHEST 1 VIEW: CPT | Mod: 26

## 2024-12-07 PROCEDURE — 81003 URINALYSIS AUTO W/O SCOPE: CPT

## 2024-12-07 PROCEDURE — 70450 CT HEAD/BRAIN W/O DYE: CPT | Mod: 26,MC

## 2024-12-07 PROCEDURE — 80048 BASIC METABOLIC PNL TOTAL CA: CPT

## 2024-12-07 PROCEDURE — 83880 ASSAY OF NATRIURETIC PEPTIDE: CPT

## 2024-12-07 PROCEDURE — 80053 COMPREHEN METABOLIC PANEL: CPT

## 2024-12-07 PROCEDURE — 83735 ASSAY OF MAGNESIUM: CPT

## 2024-12-07 PROCEDURE — 99285 EMERGENCY DEPT VISIT HI MDM: CPT

## 2024-12-07 PROCEDURE — 93010 ELECTROCARDIOGRAM REPORT: CPT

## 2024-12-07 PROCEDURE — 72125 CT NECK SPINE W/O DYE: CPT | Mod: MC

## 2024-12-07 PROCEDURE — 85610 PROTHROMBIN TIME: CPT

## 2024-12-07 PROCEDURE — 72125 CT NECK SPINE W/O DYE: CPT | Mod: 26,MC

## 2024-12-07 PROCEDURE — 85025 COMPLETE CBC W/AUTO DIFF WBC: CPT

## 2024-12-07 PROCEDURE — 71045 X-RAY EXAM CHEST 1 VIEW: CPT

## 2024-12-07 PROCEDURE — 93306 TTE W/DOPPLER COMPLETE: CPT

## 2024-12-07 PROCEDURE — 36415 COLL VENOUS BLD VENIPUNCTURE: CPT

## 2024-12-07 PROCEDURE — G0378: CPT

## 2024-12-07 PROCEDURE — 93005 ELECTROCARDIOGRAM TRACING: CPT

## 2024-12-07 PROCEDURE — 84484 ASSAY OF TROPONIN QUANT: CPT

## 2024-12-07 PROCEDURE — 0241U: CPT

## 2024-12-07 PROCEDURE — 99222 1ST HOSP IP/OBS MODERATE 55: CPT | Mod: FS

## 2024-12-07 PROCEDURE — 84443 ASSAY THYROID STIM HORMONE: CPT

## 2024-12-07 PROCEDURE — 85730 THROMBOPLASTIN TIME PARTIAL: CPT

## 2024-12-07 RX ORDER — LOSARTAN POTASSIUM 100 MG/1
50 TABLET, FILM COATED ORAL DAILY
Refills: 0 | Status: DISCONTINUED | OUTPATIENT
Start: 2024-12-07 | End: 2024-12-08

## 2024-12-07 RX ORDER — METOPROLOL TARTRATE 100 MG/1
100 TABLET, FILM COATED ORAL DAILY
Refills: 0 | Status: DISCONTINUED | OUTPATIENT
Start: 2024-12-07 | End: 2024-12-08

## 2024-12-07 RX ORDER — CYANOCOBALAMIN/FOLIC AC/VIT B6 1-2.2-25MG
1 TABLET ORAL DAILY
Refills: 0 | Status: DISCONTINUED | OUTPATIENT
Start: 2024-12-07 | End: 2024-12-08

## 2024-12-07 RX ORDER — ONDANSETRON HYDROCHLORIDE 4 MG/1
4 TABLET, FILM COATED ORAL EVERY 8 HOURS
Refills: 0 | Status: DISCONTINUED | OUTPATIENT
Start: 2024-12-07 | End: 2024-12-08

## 2024-12-07 RX ORDER — SODIUM CHLORIDE 9 MG/ML
1000 INJECTION, SOLUTION INTRAMUSCULAR; INTRAVENOUS; SUBCUTANEOUS
Refills: 0 | Status: DISCONTINUED | OUTPATIENT
Start: 2024-12-07 | End: 2024-12-08

## 2024-12-07 RX ORDER — MAGNESIUM, ALUMINUM HYDROXIDE 200-225/5
30 SUSPENSION, ORAL (FINAL DOSE FORM) ORAL EVERY 4 HOURS
Refills: 0 | Status: DISCONTINUED | OUTPATIENT
Start: 2024-12-07 | End: 2024-12-08

## 2024-12-07 RX ORDER — ACETAMINOPHEN, DIPHENHYDRAMINE HCL, PHENYLEPHRINE HCL 325; 25; 5 MG/1; MG/1; MG/1
3 TABLET ORAL AT BEDTIME
Refills: 0 | Status: DISCONTINUED | OUTPATIENT
Start: 2024-12-07 | End: 2024-12-08

## 2024-12-07 RX ORDER — RIVAROXABAN 10 MG/1
1 TABLET, FILM COATED ORAL
Refills: 0 | DISCHARGE

## 2024-12-07 RX ORDER — LOSARTAN POTASSIUM 100 MG/1
1 TABLET, FILM COATED ORAL
Refills: 0 | DISCHARGE

## 2024-12-07 RX ORDER — ACETAMINOPHEN 500MG 500 MG/1
650 TABLET, COATED ORAL EVERY 6 HOURS
Refills: 0 | Status: DISCONTINUED | OUTPATIENT
Start: 2024-12-07 | End: 2024-12-08

## 2024-12-07 RX ORDER — CYANOCOBALAMIN/FOLIC AC/VIT B6 1-2.2-25MG
1 TABLET ORAL
Refills: 0 | DISCHARGE

## 2024-12-07 RX ORDER — RIVAROXABAN 10 MG/1
20 TABLET, FILM COATED ORAL
Refills: 0 | Status: DISCONTINUED | OUTPATIENT
Start: 2024-12-07 | End: 2024-12-08

## 2024-12-07 RX ADMIN — Medication 1 TABLET(S): at 17:04

## 2024-12-07 RX ADMIN — LOSARTAN POTASSIUM 50 MILLIGRAM(S): 100 TABLET, FILM COATED ORAL at 17:04

## 2024-12-07 RX ADMIN — Medication 81 MILLIGRAM(S): at 17:04

## 2024-12-07 RX ADMIN — Medication 80 MILLIGRAM(S): at 20:58

## 2024-12-07 RX ADMIN — RIVAROXABAN 20 MILLIGRAM(S): 10 TABLET, FILM COATED ORAL at 18:05

## 2024-12-07 RX ADMIN — SODIUM CHLORIDE 50 MILLILITER(S): 9 INJECTION, SOLUTION INTRAMUSCULAR; INTRAVENOUS; SUBCUTANEOUS at 15:16

## 2024-12-07 RX ADMIN — METOPROLOL TARTRATE 100 MILLIGRAM(S): 100 TABLET, FILM COATED ORAL at 18:05

## 2024-12-07 NOTE — H&P ADULT - NSHPPHYSICALEXAM_GEN_ALL_CORE
ICU Vital Signs Last 24 Hrs  T(C): 37 (07 Dec 2024 11:30), Max: 37 (07 Dec 2024 11:30)  T(F): 98.6 (07 Dec 2024 11:30), Max: 98.6 (07 Dec 2024 11:30)  HR: 58 (07 Dec 2024 11:30) (53 - 88)  BP: 134/66 (07 Dec 2024 11:30) (134/66 - 148/92)  BP(mean): 79 (07 Dec 2024 10:21) (79 - 79)  ABP: --  ABP(mean): --  RR: 18 (07 Dec 2024 11:30) (18 - 18)  SpO2: 99% (07 Dec 2024 11:30) (99% - 100%)    O2 Parameters below as of 07 Dec 2024 11:30  Patient On (Oxygen Delivery Method): room air            PHYSICAL EXAM:    Constitutional: NAD, awake and alert  HEENT: PERR, EOMI, Normal Hearing, MMM  Neck: Soft and supple, No LAD, No JVD  Respiratory: Breath sounds are clear bilaterally, No wheezing, rales or rhonchi  Cardiovascular: S1 and S2, regular rate and rhythm, no Murmurs, gallops or rubs  Gastrointestinal: Bowel Sounds present, soft, nontender, nondistended, no guarding, no rebound  Extremities: No peripheral edema  Vascular: 2+ peripheral pulses  Neurological: A/O x 3, no focal deficits  Musculoskeletal: 5/5 strength b/l upper and lower extremities  Skin: No rashes

## 2024-12-07 NOTE — PATIENT PROFILE ADULT - FALL HARM RISK - HARM RISK INTERVENTIONS

## 2024-12-07 NOTE — ED ADULT NURSE NOTE - OBJECTIVE STATEMENT
Pt presents to Ed c/o fall x2. pt reports he became dizzy this morning and feel to ground, pt then stood up and became dizzy again and fell again. +head strike. Denies pain, SOB. Dizziness resolved

## 2024-12-07 NOTE — ED ADULT NURSE NOTE - ALCOHOL PRE SCREEN (AUDIT - C)
Patient called stated [facility] did not get pap supply order. Informed patient pap supply order was faxed on 2/6/24 and will refax pap supply order with a STAT.  Called [email or web address] to check the status of the patient's pap supply order.  Jake stated he would give the patient a call to inform the patient of supply status.   Statement Selected

## 2024-12-07 NOTE — ED PROVIDER NOTE - OBJECTIVE STATEMENT
Patient is a 79-year-old male with history of A-fib on Xarelto.  Patient reports that he got up this morning and felt dizzy upon his head against the wall.  Patient reports that he fell and got up and walking around again felt lightheaded and then passed out.  Patient denies any chest pain or shortness of breath.  Patient denies any abdominal pain.  Patient denies any current dizziness at this time.  Patient has been feeling well over the last few days with no fever chills or cough.  Immunizations up-to-date.

## 2024-12-07 NOTE — ED ADULT NURSE NOTE - NSFALLHARMRISKINTERV_ED_ALL_ED

## 2024-12-07 NOTE — H&P ADULT - ASSESSMENT
"79-year-old male with history of A-fib on Xarelto, 4V CABG 2018, no h/o cardiac stents,  reports that he got up this morning and felt dizzy upon his head against the wall.  Patient reports that he fell and got up and walking around again felt lightheaded and then passed out.  Patient denies any chest pain or shortness of breath.  Patient denies any abdominal pain.  Patient denies any current dizziness at this time.  Patient has been feeling well over the last few days with no fever chills or cough.  Immunizations up-to-date."      PT seen and examined in ED.  Wife at bedside. States he got up from bed at 0600 , urinated then suddenly got dizzy and may have passed out. Wife heard a thump, pt had hit his head on wall. She attempted to assist him to bed then he passed out again. Episodes lasted seconds only. pt had no recollection of event. No sz like activity. He believes may have had palpitations during event. No recent change in meds. Has been in normal state of health the last few weeks with normal exercise tolerance .     ED course:   TNI neg x 1  CTH: neg  EKG: SB 55 bpm, no other acute changes.       #Micturition syncope vs orthostasis  - dispo to tele obs  - TNI neg x 1  - repeat trops in 6 hours  - 2D echo  - orthostatics  - monitor for arrythmias on tele  - gentle ivf  - if any abnl echo findings-> consult [facility] cardiology  - DVT px: xarelto      Hopeful for dc in next 4 hours w/ o/p cardiology follow up if nl echo/neg trops

## 2024-12-07 NOTE — H&P ADULT - HISTORY OF PRESENT ILLNESS
"79-year-old male with history of A-fib on Xarelto, 4V CABG 2018, no h/o cardiac stents,  reports that he got up this morning and felt dizzy upon his head against the wall.  Patient reports that he fell and got up and walking around again felt lightheaded and then passed out.  Patient denies any chest pain or shortness of breath.  Patient denies any abdominal pain.  Patient denies any current dizziness at this time.  Patient has been feeling well over the last few days with no fever chills or cough.  Immunizations up-to-date."      PT seen and examined in ED.  Wife at bedside. States he got up from bed at 0600 , urinated then suddenly got dizzy and may have passed out. Wife heard a thump, pt had hit his head on wall. She attempted to assist him to bed then he passed out again. Episodes lasted seconds only. pt had no recollection of event. No sz like activity. He believes may have had palpitations during event. No recent change in meds. Has been in normal state of health the last few weeks with normal exercise tolerance .  No hypoxia, no tachycardia.     ED course:   TNI neg x 1  CTH: neg  EG: SB 55 bpm, no other acute changes.   RVP: neg        Dr. Carrillo PMD - [facility] [place]   Dr. Haney - Cardiologist        Past Medical History:  Hypertension, unspecified type.     Past Surgical History:  cabg       Social History:  · Marital Status	  · Occupation	retired [employer]  · Lives With	spouse

## 2024-12-07 NOTE — ED ADULT TRIAGE NOTE - CHIEF COMPLAINT QUOTE
Pt BIBEMS from home complaining of fall. Pt was in the bathroom when he became dizzy and fell +headstrike unknown LOC + ACS use pt on Xarelto for afib. NA called in triage. Pt denies pain.

## 2024-12-07 NOTE — ED PROVIDER NOTE - PROGRESS NOTE DETAILS
Nelson DO: wife gives hx that she heard patient fall twice; patient originally hit his head on wall; then wife went into bathroom to see him and go to her son to get help and when she left the bathroom- heard an additional fall; patient denies any pain.

## 2024-12-07 NOTE — ED ADULT NURSE NOTE - IN ACCORDANCE WITH [place] LAW, WE OFFER EVERY PATIENT A HEPATITIS C TEST. WOULD YOU LIKE TO BE TESTED TODAY?
Pt arrives via ems from home, pt was sleeping and woke up SOB. Pt was here yesterday, pt to follow up as outpatient Tuesday for CT. Pt \"it hurts to breath\".  C/o epigastric pain with breathing
Opt out

## 2024-12-07 NOTE — ED ADULT NURSE REASSESSMENT NOTE - NS ED NURSE REASSESS COMMENT FT1
Pt ambulated to bathroom, to supply urine sample, but states he did not urinate because he hasn't had anything to drink for hours. pt supplied with water. pt states he will drink fluids and reattempt to urinate

## 2024-12-07 NOTE — H&P ADULT - NSHPLABSRESULTS_GEN_ALL_CORE
LABS: All Labs Reviewed:                        14.5   7.31  )-----------( 158      ( 07 Dec 2024 08:33 )             44.0     12-07    140  |  111[H]  |  19  ----------------------------<  103[H]  4.1   |  26  |  1.12    Ca    9.2      07 Dec 2024 09:50  Mg     2.2     12-07    TPro  6.8  /  Alb  3.3  /  TBili  1.0  /  DBili  x   /  AST  31  /  ALT  30  /  AlkPhos  73  12-07    PT/INR - ( 07 Dec 2024 08:33 )   PT: 11.4 sec;   INR: 0.99 ratio         PTT - ( 07 Dec 2024 08:33 )  PTT:27.1 sec          Blood Culture:

## 2024-12-07 NOTE — ED PROVIDER NOTE - CLINICAL SUMMARY MEDICAL DECISION MAKING FREE TEXT BOX
80 yo male with dizziness, syncope; closed head injury; patient to CT scan immediately as on xeralto and closed head injury; screening ekg, cxr, labs; urine; flu/covid/rsv; re-eval closely 80 yo male with dizziness, syncope; closed head injury; patient to CT scan immediately as on xeralto and closed head injury; screening ekg, cxr, labs; urine; flu/covid/rsv; re-eval closely    Nelson DO: in setting of cardiac hx; syncope- ?no prodromal symptoms; (+) LOC; will place in obs for further work up; patient reports that he sees a cardiology in [place] that works with [facility]- consult placed for this group; endorsed to Dr. Ingram for observation.

## 2024-12-08 VITALS — WEIGHT: 147.05 LBS

## 2024-12-08 LAB
ANION GAP SERPL CALC-SCNC: 2 MMOL/L — LOW (ref 5–17)
BUN SERPL-MCNC: 18 MG/DL — SIGNIFICANT CHANGE UP (ref 7–23)
CALCIUM SERPL-MCNC: 9 MG/DL — SIGNIFICANT CHANGE UP (ref 8.5–10.1)
CHLORIDE SERPL-SCNC: 114 MMOL/L — HIGH (ref 96–108)
CO2 SERPL-SCNC: 25 MMOL/L — SIGNIFICANT CHANGE UP (ref 22–31)
CREAT SERPL-MCNC: 0.95 MG/DL — SIGNIFICANT CHANGE UP (ref 0.5–1.3)
EGFR: 82 ML/MIN/1.73M2 — SIGNIFICANT CHANGE UP
GLUCOSE SERPL-MCNC: 109 MG/DL — HIGH (ref 70–99)
POTASSIUM SERPL-MCNC: 3.9 MMOL/L — SIGNIFICANT CHANGE UP (ref 3.5–5.3)
POTASSIUM SERPL-SCNC: 3.9 MMOL/L — SIGNIFICANT CHANGE UP (ref 3.5–5.3)
SODIUM SERPL-SCNC: 141 MMOL/L — SIGNIFICANT CHANGE UP (ref 135–145)
TROPONIN I, HIGH SENSITIVITY RESULT: 19.74 NG/L — SIGNIFICANT CHANGE UP

## 2024-12-08 PROCEDURE — 99239 HOSP IP/OBS DSCHRG MGMT >30: CPT

## 2024-12-08 RX ORDER — LANOLIN ALCOHOL/MO/W.PET/CERES
1 CREAM (GRAM) TOPICAL
Qty: 30 | Refills: 0
Start: 2024-12-08

## 2024-12-08 RX ADMIN — Medication 1 TABLET(S): at 10:03

## 2024-12-08 RX ADMIN — LOSARTAN POTASSIUM 50 MILLIGRAM(S): 100 TABLET, FILM COATED ORAL at 10:03

## 2024-12-08 RX ADMIN — METOPROLOL TARTRATE 100 MILLIGRAM(S): 100 TABLET, FILM COATED ORAL at 10:03

## 2024-12-08 RX ADMIN — Medication 81 MILLIGRAM(S): at 10:04

## 2024-12-08 NOTE — DIETITIAN INITIAL EVALUATION ADULT - OTHER INFO
79-year-old male with history of A-fib on Xarelto, 4V CABG 2018, no h/o cardiac stents,  reports that he got up this morning and felt dizzy upon his head against the wall.  Patient reports that he fell and got up and walking around again felt lightheaded and then passed out.  Admitted for syncope w/ unclear etiology.    RD consulted for Pressure injury stage 2 or > - pt unaware of PI. Does report he sits often. Appears thin, bony. NFPE reveals severe muscle/ fat wasting; meets criteria for PCM.  #, denies wt changes. RD took bed scale wt on 12/8 at 147#. Admit wt 146#. On DASH/TLC diet, suggest to Liberalize diet to regular to maximize caloric and nutrient intake. RD encouraged protein-rich foods. trial of ensure + HP BID, pt receptive. See additional recs below.

## 2024-12-08 NOTE — DISCHARGE NOTE NURSING/CASE MANAGEMENT/SOCIAL WORK - NSFLUVACAGEDISCH_IMM_ALL_CORE
Adult left sided numbness left sided numbness left sided numbness left sided numbness left sided numbness left sided numbness left sided numbness

## 2024-12-08 NOTE — DISCHARGE NOTE PROVIDER - DETAILS OF MALNUTRITION DIAGNOSIS/DIAGNOSES
This patient has been assessed with a concern for Malnutrition and was treated during this hospitalization for the following Nutrition diagnosis/diagnoses:     -  12/08/2024: Severe protein-calorie malnutrition

## 2024-12-08 NOTE — DISCHARGE NOTE PROVIDER - HOSPITAL COURSE
78 yo man with CAD, CABG in 2018, paroxysmal afib on xarelto, here for evaluation after an episode of syncope. Patient got up in the morning, felt some dizziness, hit his head against the wall and fell, then got up was still dizzy and passed out. Wife heard a thumb and came to his aid. In the ED, /92, HR 88, resp rate, O2, temp WNL. ED exam was unremarkable. CBC WNL. BMP essentially WNL. Serial troponin negative. EKG SR, rate 55. UA negative. RVP negative. CT head w /mild chronic microvascular changes without evidence of an acute transcortical infarction or hemorrhage. CT cervical spine with moderate spondylosis. No acute osseous abnormality. CXR with normal heart appearance, sternotomy again noted, lungs showed some hyperexpansion. No infiltrate. No change from August 11, 2021. No fracture seen. Patient was placed on tele and triaged to 3E.     Syncope  Could have been orthostatic, possibly a bit dehydrated as UA showed some urine ketones. Labs were otherwise normal. Orthostatics now negative but he has since received some IV fluid hydration. Vasovagal is possible. He does not seem to have suffered any acute cardiac condition such as malignant arrhythmia as telemetry monitoring has been quite unrevealing. No sign of acute coronary syndrome with serial cardiac enzymes negative and no ischemic changes on EKG. No clinical signs of decompensated CHF nor does he have any severe heart valve disease suggested by exam. TTE was done. Normal LVEF. Grade 1 LV diastolic dysfunction. Normal RV. Mild to mod MR. Normal PASP. Neurologically he is intact. CT head and C spine negative. No sign of infection. UA negative. RVP negative. No sign of bleeding, Hgb WNL. Ambulating steadily. No symptoms today. Stable for discharge home. Patient to follow up with his cardiologist Dr Kasi Rollins. Could consider [facility] as outpatient.     CAD  No angina, no CHF. Continue aspirin, statin, metoprolol, losartan    Paroxysmal afib  HR 50s-60s. Continue metoprolol. Continue xarelto.     Severe protein calorie malnutrition  Appreciate input from dietician. Supplement diet with Ensure high protein daily. Thiamine daily. MVI daily.       Discharge Exam:  Afebrile  /79  HR 54  RR 18  O2 99% on RA  Orthostatics negative  Gen: Comfortable  HEENT: NCAT PERRL EOMI MMM clear oropharynx  Neck: Supple, no JVD, no LAD, no C spine tenderness  CVS: s1 s2 normal, RRR  Chest: Normal resp effort, lungs CTA B/L  Abd: Non distended, +BS, soft, non tender  Ext: No edema, no extremity tenderness, intact peripheral pulses, normal cap refill  Skin: Warm, dry  Mood: Calm, pleasant  Neuro: A+Ox3, no deficits    Labs:                        14.5   7.31  )----------( 158                  44.0       141  |  114  |  18  ----------------------<  109  3.9   |  25  |  0.95    Ca    9.0     Mg     2.2         TPro  6.8  /  Alb  3.3  /  TBili  1.0  /  DBili  x   /  AST  31  /  ALT  30  /  AlkPhos  73      PT: 11.4 sec;   INR: 0.99 ratio    PTT:27.1 sec    Troponin negative x 3   proBNP 701   TSH WNL    UA Yellow, clear, prot-, gluc-, ket 15, bld-, bili-, LE-, N-    Micro:  COVID19, flu, RSV PCR 12/7: Negative    Imaging:  CXR 12/7: Heart normal for projection. Sternotomy again noted. Lungs show some hyperexpansion. No infiltrate. No change from August 11, 2021. No fracture.    CT C-spine WO 12/7: Mild reversal to the normal cervical lordosis. Moderate spondylosis. No acute osseous abnormality.    CT head WO 12/7: Mild chronic microvascular changes without evidence of an acute transcortical infarction or hemorrhage.    Cardiac Testing:  Tele 12/8: SR/SB, 50s-60s, occasional  PVCs    TTE 12/7: Left ventricular cavity is normal in size. Left ventricular wall thickness is normal. Left ventricular systolic function is normal with an ejection fraction visually estimated at 55 to 60 %. There is mild (grade 1) left ventricular diastolic dysfunction. Normal right ventricular cavity size and normal right ventricular systolic function. Normal left and right atrial size. Mild to moderate mitral regurgitation. Mild tricuspid regurgitation. Estimated pulmonary artery systolic pressure is 24 mmHg, consistent with normal pulmonary artery pressure. 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 Ascending aorta is dilated, measuring 4.25 cm (indexed 2.30 cm/m²). Tricuspid aortic valve with normal leaflet excursion with normal systolic excursion. There is mild thickening of the aortic valve leaflets. Mild aortic regurgitation. Mild pulmonic regurgitation.    EKG 12/7: SR, rate 55, otherwise normal

## 2024-12-08 NOTE — PROGRESS NOTE ADULT - ASSESSMENT
80 yo man with CAD, CABG in 2018, paroxysmal afib on xarelto, here for evaluation after an episode of syncope. Patient got up in the morning, felt some dizziness, hit his head against the wall and fell, then got up was still dizzy and passed out. Wife heard a thumb and came to his aid. In the ED, /92, HR 88, resp rate, O2, temp WNL. ED exam was unremarkable. CBC WNL. BMP essentially WNL. Serial troponin negative. EKG SR, rate 55. UA negative. RVP negative. CT head w /mild chronic microvascular changes without evidence of an acute transcortical infarction or hemorrhage. CT cervical spine with moderate spondylosis. No acute osseous abnormality. CXR with normal heart appearance, sternotomy again noted, lungs showed some hyperexpansion. No infiltrate. No change from August 11, 2021. No fracture seen. Patient was placed on tele and triaged to 3E.     Syncope  Etiology unclear. Could have been orthostatic, possibly a bit dehydrated as UA showed some urine ketones. Labs were otherwise normal. Orthostatics now negative but he has since received some IV fluid hydration. Vasovagal is possible. He does not seem to have suffered any acute cardiac condition such as malignant arrhythmia as telemetry monitoring has been quite unrevealing. No sign of acute coronary syndrome with serial cardiac enzymes negative and no ischemic changes on EKG. No clinical signs of decompensated CHF nor does he have any severe heart valve disease suggested by exam. TTE was done. Normal LVEF. Grade 1 LV diastolic dysfunction. Normal RV. Mild to mod MR. Normal PASP. Neurologically he is intact. CT head and C spine negative. No sign of infection. UA negative. RVP negative. No sign of bleeding, Hgb WNL. Will ambulate patient and assess for safe discharge plan.     CAD  No angina, no CHF. Continue aspirin, statin, metoprolol, losartan    Paroxysmal afib  HR 50s-60s. Continue metoprolol. Continue xarelto.       DIspo Anticipate possible DC today 80 yo man with CAD, CABG in 2018, paroxysmal afib on xarelto, here for evaluation after an episode of syncope. Patient got up in the morning, felt some dizziness, hit his head against the wall and fell, then got up was still dizzy and passed out. Wife heard a thumb and came to his aid. In the ED, /92, HR 88, resp rate, O2, temp WNL. ED exam was unremarkable. CBC WNL. BMP essentially WNL. Serial troponin negative. EKG SR, rate 55. UA negative. RVP negative. CT head w /mild chronic microvascular changes without evidence of an acute transcortical infarction or hemorrhage. CT cervical spine with moderate spondylosis. No acute osseous abnormality. CXR with normal heart appearance, sternotomy again noted, lungs showed some hyperexpansion. No infiltrate. No change from August 11, 2021. No fracture seen. Patient was placed on tele and triaged to 3E.     Syncope  Etiology unclear. Could have been orthostatic, possibly a bit dehydrated as UA showed some urine ketones. Labs were otherwise normal. Orthostatics now negative but he has since received some IV fluid hydration. Vasovagal is possible. He does not seem to have suffered any acute cardiac condition such as malignant arrhythmia as telemetry monitoring has been quite unrevealing. No sign of acute coronary syndrome with serial cardiac enzymes negative and no ischemic changes on EKG. No clinical signs of decompensated CHF nor does he have any severe heart valve disease suggested by exam. TTE was done. Normal LVEF. Grade 1 LV diastolic dysfunction. Normal RV. Mild to mod MR. Normal PASP. Neurologically he is intact. CT head and C spine negative. No sign of infection. UA negative. RVP negative. No sign of bleeding, Hgb WNL. Ambulating steadily. No symptoms today. Stable for discharge home. Patient to follow up with his cardiologist Dr Kasi Rollins. Could consider [facility] as outpatient.     CAD  No angina, no CHF. Continue aspirin, statin, metoprolol, losartan    Paroxysmal afib  HR 50s-60s. Continue metoprolol. Continue xarelto.       DIspo DC today

## 2024-12-08 NOTE — DISCHARGE NOTE NURSING/CASE MANAGEMENT/SOCIAL WORK - FINANCIAL ASSISTANCE
[facility] provides services at a reduced cost to those who are determined to be eligible through [facility]’s financial assistance program. Information regarding [facility]’s financial assistance program can be found by going to [email or web address] or by calling [phone].

## 2024-12-08 NOTE — PROGRESS NOTE ADULT - SUBJECTIVE AND OBJECTIVE BOX
Chief Complaint: Syncope    Interval Hx: Patient seen and examined. No acute complaints today. No dizziness, lightheadedness or palpitations. No chest pain or tightness. No dyspnea, orthopnea or PND. No abdominal complaints or urinary complaints. No fever or chills. No rash. No synovitis.     ROS: Multi system review is comprehensively negative x 10 systems    Vitals:  T(F): 97.7 (08 Dec 2024 00:00), Max: 98.6 (07 Dec 2024 11:30)  HR: 54 (08 Dec 2024 00:00) (53 - 59)  BP: 127/70 (08 Dec 2024 00:00) (127/70 - 148/77)  RR: 18 (08 Dec 2024 00:00) (17 - 18)  SpO2: 98% (08 Dec 2024 00:00) (98% - 100%) on room air    Lying BP: 144/78 HR 56   Sitting BP: 177/65 HR 59  Standing BP: 169/91 HR 69    Exam:  Gen: Comfortable  HEENT: NCAT PERRL EOMI MMM clear oropharynx  Neck: Supple, no JVD, no LAD, no C spine tenderness  CVS: s1 s2 normal, RRR  Chest: Normal resp effort, lungs CTA B/L  Abd: Non distended, +BS, soft, non tender  Ext: No edema, no extremity tenderness, intact peripheral pulses, normal cap refill  Skin: Warm, dry  Mood: Calm, pleasant  Neuro: A+Ox3, no deficits    Labs:                        14.5   7.31  )----------( 158                  44.0       141  |  114  |  18  --------------------<  109  3.9   |  25  |  0.95    Ca    9.0     Mg     2.2         TPro  6.8  /  Alb  3.3  /  TBili  1.0  /  DBili  x   /  AST  31  /  ALT  30  /  AlkPhos  73      PT: 11.4 sec;   INR: 0.99 ratio    PTT:27.1 sec    Troponin negative x 3   proBNP 701   TSH WNL    UA Yellow, clear, prot-, gluc-, ket 15, bld-, bili-, LE-, N-    Micro:  COVID19, flu, RSV PCR 12/7: Negative    Imaging:  CXR 12/7: Heart normal for projection. Sternotomy again noted. Lungs show some hyperexpansion. No infiltrate. No change from August 11, 2021. No fracture.    CT C-spine WO 12/7: Mild reversal to the normal cervical lordosis. Moderate spondylosis. No acute osseous abnormality.    CT head WO 12/7: Mild chronic microvascular changes without evidence of an acute transcortical infarction or hemorrhage.    Cardiac Testing:  Tele 12/8: SR/SB, 50s-60s, occasional  PVCs    TTE 12/7: Left ventricular cavity is normal in size. Left ventricular wall thickness is normal. Left ventricular systolic function is normal with an ejection fraction visually estimated at 55 to 60 %. There is mild (grade 1) left ventricular diastolic dysfunction. Normal right ventricular cavity size and normal right ventricular systolic function. Normal left and right atrial size. Mild to moderate mitral regurgitation. Mild tricuspid regurgitation. Estimated pulmonary artery systolic pressure is 24 mmHg, consistent with normal pulmonary artery pressure. 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 Ascending aorta is dilated, measuring 4.25 cm (indexed 2.30 cm/m²). Tricuspid aortic valve with normal leaflet excursion with normal systolic excursion. There is mild thickening of the aortic valve leaflets. Mild aortic regurgitation. Mild pulmonic regurgitation.    EKG 12/7: SR, rate 55, otherwise normal    Meds:  MEDICATIONS  (STANDING):  aspirin enteric coated 81 milliGRAM(s) Oral daily  atorvastatin 80 milliGRAM(s) Oral at bedtime  losartan 50 milliGRAM(s) Oral daily  metoprolol succinate  milliGRAM(s) Oral daily  multivitamin 1 Tablet(s) Oral daily  rivaroxaban 20 milliGRAM(s) Oral with dinner  sodium chloride 0.9%. 1000 milliLiter(s) (50 mL/Hr) IV Continuous <Continuous>    MEDICATIONS  (PRN):  acetaminophen     Tablet .. 650 milliGRAM(s) Oral every 6 hours PRN Mild Pain (1 - 3)  aluminum hydroxide/magnesium hydroxide/simethicone Suspension 30 milliLiter(s) Oral every 4 hours PRN Dyspepsia  melatonin 3 milliGRAM(s) Oral at bedtime PRN Insomnia  ondansetron Injectable 4 milliGRAM(s) IV Push every 8 hours PRN Nausea and/or Vomiting

## 2024-12-08 NOTE — CONSULT NOTE ADULT - SUBJECTIVE AND OBJECTIVE BOX
Patient is a 79y old  Male who presents with a chief complaint of syncope.       HPI:  "79-year-old male with history of A-fib on Xarelto, 4V CABG 2018, no h/o cardiac stents,  reports that he got up this morning and felt dizzy upon his head against the wall.  Patient reports that he fell and got up and walking around again felt lightheaded and then passed out.  Patient denies any chest pain or shortness of breath.  Patient denies any abdominal pain.  Patient denies any current dizziness at this time.  Patient has been feeling well over the last few days with no fever chills or cough.  Immunizations up-to-date."      PT seen and examined in ED.  Wife at bedside. States he got up from bed at 0600 , urinated then suddenly got dizzy and may have passed out. Wife heard a thump, pt had hit his head on wall. She attempted to assist him to bed then he passed out again. Episodes lasted seconds only. pt had no recollection of event. No sz like activity. He believes may have had palpitations during event. No recent change in meds. Has been in normal state of health the last few weeks with normal exercise tolerance .  No hypoxia, no tachycardia.     ED course:   TNI neg x 1  CTH: neg  EG: SB 55 bpm, no other acute changes.   RVP: neg        Dr. Carrillo PMD - [facility]   Dr. Haney - Cardiologist     - Mr Hodges was seen and examined by me this am.       Past Medical History:  Hypertension, unspecified type.     Past Surgical History:  cabg       Social History:  · Marital Status	  · Occupation	retired [employer]  · Lives With	spouse   (07 Dec 2024 11:56)      PAST MEDICAL & SURGICAL HISTORY:  Hypertension, unspecified type      No significant past surgical history          MEDICATIONS  (STANDING):  aspirin enteric coated 81 milliGRAM(s) Oral daily  atorvastatin 80 milliGRAM(s) Oral at bedtime  losartan 50 milliGRAM(s) Oral daily  metoprolol succinate  milliGRAM(s) Oral daily  multivitamin 1 Tablet(s) Oral daily  rivaroxaban 20 milliGRAM(s) Oral with dinner  sodium chloride 0.9%. 1000 milliLiter(s) (50 mL/Hr) IV Continuous <Continuous>    MEDICATIONS  (PRN):  acetaminophen     Tablet .. 650 milliGRAM(s) Oral every 6 hours PRN Mild Pain (1 - 3)  aluminum hydroxide/magnesium hydroxide/simethicone Suspension 30 milliLiter(s) Oral every 4 hours PRN Dyspepsia  melatonin 3 milliGRAM(s) Oral at bedtime PRN Insomnia  ondansetron Injectable 4 milliGRAM(s) IV Push every 8 hours PRN Nausea and/or Vomiting      FAMILY HISTORY:  Family history of cerebrovascular accident (CVA)        SOCIAL HISTORY: no recent smoking     REVIEW OF SYSTEMS:  CONSTITUTIONAL:    No fatigue, malaise, lethargy.  No fever or chills.  RESPIRATORY:  No cough.  No wheeze.  No hemoptysis.    CARDIOVASCULAR:  No chest pains.  No palpitations. No shortness of breath, No orthopnea or PND.c/o syncope and dizziness  GASTROINTESTINAL:  No abdominal pain.  No nausea or vomiting.    GENITOURINARY:    No hematuria.    MUSCULOSKELETAL:  No musculoskeletal pain.  No joint swelling.  No arthritis.  NEUROLOGICAL:  No tingling or numbness or weakness.  PSYCHIATRIC:  No confusion  SKIN:  No rashes.            Vital Signs Last 24 Hrs  T(C): 36.5 (08 Dec 2024 00:00), Max: 37 (07 Dec 2024 11:30)  T(F): 97.7 (08 Dec 2024 00:00), Max: 98.6 (07 Dec 2024 11:30)  HR: 54 (08 Dec 2024 00:00) (53 - 88)  BP: 127/70 (08 Dec 2024 00:00) (127/70 - 148/92)  BP(mean): 79 (07 Dec 2024 10:21) (79 - 79)  RR: 18 (08 Dec 2024 00:00) (17 - 18)  SpO2: 98% (08 Dec 2024 00:00) (98% - 100%)    Parameters below as of 08 Dec 2024 00:00  Patient On (Oxygen Delivery Method): room air        PHYSICAL EXAM-    Constitutional:  no acute distress     Head: Head is normocephalic and atraumatic.      Neck:  No JVD.     Cardiovascular: Regular rate and rhythm without S3, S4. No murmurs or rubs are appreciated.      Respiratory: Breathsounds are normal. No rales. No wheezing.    Abdomen: Soft, nontender, nondistended with positive bowel sounds.      Extremity: No tenderness. No  pitting edema     Neurologic: The patient is alert and oriented.      Skin: No rash, no obvious lesions noted.      Psychiatric: The patient appears to be emotionally stable.      INTERPRETATION OF TELEMETRY:    ECG: Sinus rythm ,  no ST T changes.     I&O's Detail      LABS:                        14.5   7.31  )-----------( 158      ( 07 Dec 2024 08:33 )             44.0         140  |  111[H]  |  19  ----------------------------<  103[H]  4.1   |  26  |  1.12    Ca    9.2      07 Dec 2024 09:50  Mg     2.2         TPro  6.8  /  Alb  3.3  /  TBili  1.0  /  DBili  x   /  AST  31  /  ALT  30  /  AlkPhos  73  12        PT/INR - ( 07 Dec 2024 08:33 )   PT: 11.4 sec;   INR: 0.99 ratio         PTT - ( 07 Dec 2024 08:33 )  PTT:27.1 sec  Urinalysis Basic - ( 07 Dec 2024 13:00 )    Color: Yellow / Appearance: Clear / S.014 / pH: x  Gluc: x / Ketone: 15 mg/dL  / Bili: Negative / Urobili: 0.2 mg/dL   Blood: x / Protein: Negative mg/dL / Nitrite: Negative   Leuk Esterase: Negative / RBC: x / WBC x   Sq Epi: x / Non Sq Epi: x / Bacteria: x      I&O's Summary    BNP  RADIOLOGY & ADDITIONAL STUDIES:  < from: TTE W or WO Ultrasound Enhancing Agent (24 @ 12:57) >      1. Left ventricular cavity is normal in size. Left ventricular wall thickness is normal. Left ventricular systolic function is normal with an ejection fraction visually estimated at 55 to 60 %.   2. There is mild (grade 1) left ventricular diastolic dysfunction.   3. Normal right ventricular cavity size and normal right ventricular systolic function.   4. Normal left and right atrial size.   5. Mild to moderate mitral regurgitation.   6. Mild tricuspid regurgitation.   7. Estimated pulmonary artery systolic pressure is 24 mmHg, consistent with normal pulmonary artery pressure.   8. 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 Ascending aorta is dilated, measuring 4.25 cm (indexed 2.30 cm/m²).   9. Tricuspid aortic valve with normal leaflet excursion with normal systolic excursion. There is mild thickening of the aortic valve leaflets.  10. Mild aortic regurgitation.  11. Mild pulmonic regurgitation.    < end of copied text >  < from: CT Head No Cont (24 @ 07:57) >  CT head: Mild chronic microvascular changes without evidence of an acute   transcortical infarction or hemorrhage.    CT C-spine: Mild reversal to the normal cervical lordosis. Moderate   spondylosis. No acute osseous abnormality. Consider MRI as clinically   warranted.    --- End of Report ---            JAEL ZAFAR MD; Attending Radiologist  This document has been electronically signed. Dec  7 2024  8:47AM    < end of copied text >   Patient is a 79y old  Male who presents with a chief complaint of syncope.       HPI:  "79-year-old male with history of A-fib on Xarelto, 4V CABG 2018, no h/o cardiac stents,  reports that he got up this morning and felt dizzy upon his head against the wall.  Patient reports that he fell and got up and walking around again felt lightheaded and then passed out.  Patient denies any chest pain or shortness of breath.  Patient denies any abdominal pain.  Patient denies any current dizziness at this time.  Patient has been feeling well over the last few days with no fever chills or cough.  Immunizations up-to-date."    ER family account :    Wife at bedside. States he got up from bed at 0600 , urinated then suddenly got dizzy and may have passed out. Wife heard a thump, pt had hit his head on wall. She attempted to assist him to bed then he passed out again. Episodes lasted seconds only. pt had no recollection of event. No sz like activity. He believes may have had palpitations during event. No recent change in meds. Has been in normal state of health the last few weeks with normal exercise tolerance .  No hypoxia, no tachycardia.     ED course:   TNI neg x 1  CTH: neg  EG: SB 55 bpm, no other acute changes.   RVP: neg        Dr. Carrillo PMD - [facility]   Dr. Haney - Cardiologist     - Mr Hodges was seen and examined by me this am.  He denies any further dizziness.       Past Medical History:  Hypertension, unspecified type.     Past Surgical History:  cabg       Social History:  · Marital Status	  · Occupation	retired [employer]  · Lives With	spouse   (07 Dec 2024 11:56)      PAST MEDICAL & SURGICAL HISTORY:  Hypertension, unspecified type      No significant past surgical history          MEDICATIONS  (STANDING):  aspirin enteric coated 81 milliGRAM(s) Oral daily  atorvastatin 80 milliGRAM(s) Oral at bedtime  losartan 50 milliGRAM(s) Oral daily  metoprolol succinate  milliGRAM(s) Oral daily  multivitamin 1 Tablet(s) Oral daily  rivaroxaban 20 milliGRAM(s) Oral with dinner  sodium chloride 0.9%. 1000 milliLiter(s) (50 mL/Hr) IV Continuous <Continuous>    MEDICATIONS  (PRN):  acetaminophen     Tablet .. 650 milliGRAM(s) Oral every 6 hours PRN Mild Pain (1 - 3)  aluminum hydroxide/magnesium hydroxide/simethicone Suspension 30 milliLiter(s) Oral every 4 hours PRN Dyspepsia  melatonin 3 milliGRAM(s) Oral at bedtime PRN Insomnia  ondansetron Injectable 4 milliGRAM(s) IV Push every 8 hours PRN Nausea and/or Vomiting      FAMILY HISTORY:  Family history of cerebrovascular accident (CVA)        SOCIAL HISTORY: no recent smoking     REVIEW OF SYSTEMS:  CONSTITUTIONAL:    No fatigue, malaise, lethargy.  No fever or chills.  RESPIRATORY:  No cough.  No wheeze.  No hemoptysis.    CARDIOVASCULAR:  No chest pains.  No palpitations. No shortness of breath, No orthopnea or PND.c/o syncope and dizziness  GASTROINTESTINAL:  No abdominal pain.  No nausea or vomiting.    GENITOURINARY:    No hematuria.    MUSCULOSKELETAL:  No musculoskeletal pain.  No joint swelling.  No arthritis.  NEUROLOGICAL:  No tingling or numbness or weakness.  PSYCHIATRIC:  No confusion  SKIN:  No rashes.            Vital Signs Last 24 Hrs  T(C): 36.5 (08 Dec 2024 00:00), Max: 37 (07 Dec 2024 11:30)  T(F): 97.7 (08 Dec 2024 00:00), Max: 98.6 (07 Dec 2024 11:30)  HR: 54 (08 Dec 2024 00:00) (53 - 88)  BP: 127/70 (08 Dec 2024 00:00) (127/70 - 148/92)  BP(mean): 79 (07 Dec 2024 10:21) (79 - 79)  RR: 18 (08 Dec 2024 00:00) (17 - 18)  SpO2: 98% (08 Dec 2024 00:00) (98% - 100%)    Parameters below as of 08 Dec 2024 00:00  Patient On (Oxygen Delivery Method): room air        PHYSICAL EXAM-    Constitutional:  no acute distress , elderly frail male     Head: Head is normocephalic and atraumatic.      Neck:  No JVD.     Cardiovascular: Regular rate and rhythm without S3, S4. No murmurs or rubs are appreciated.      Respiratory: Breath sounds are normal. No rales. No wheezing.    Abdomen: Soft, nontender, nondistended with positive bowel sounds.      Extremity: No tenderness. No  pitting edema     Neurologic: The patient is alert and oriented.      Skin: No rash, no obvious lesions noted.      Psychiatric: The patient appears to be emotionally stable.      INTERPRETATION OF TELEMETRY: SR, SB 50-60/min, PVCs    ECG: Sinus rythm ,  no ST T changes.     I&O's Detail      LABS:                        14.5   7.31  )-----------( 158      ( 07 Dec 2024 08:33 )             44.0     12-    140  |  111[H]  |  19  ----------------------------<  103[H]  4.1   |  26  |  1.12    Ca    9.2      07 Dec 2024 09:50  Mg     2.2     12-    TPro  6.8  /  Alb  3.3  /  TBili  1.0  /  DBili  x   /  AST  31  /  ALT  30  /  AlkPhos  73  12-        PT/INR - ( 07 Dec 2024 08:33 )   PT: 11.4 sec;   INR: 0.99 ratio         PTT - ( 07 Dec 2024 08:33 )  PTT:27.1 sec  Urinalysis Basic - ( 07 Dec 2024 13:00 )    Color: Yellow / Appearance: Clear / S.014 / pH: x  Gluc: x / Ketone: 15 mg/dL  / Bili: Negative / Urobili: 0.2 mg/dL   Blood: x / Protein: Negative mg/dL / Nitrite: Negative   Leuk Esterase: Negative / RBC: x / WBC x   Sq Epi: x / Non Sq Epi: x / Bacteria: x      I&O's Summary    BNP  RADIOLOGY & ADDITIONAL STUDIES:  < from: TTE W or WO Ultrasound Enhancing Agent (24 @ 12:57) >      1. Left ventricular cavity is normal in size. Left ventricular wall thickness is normal. Left ventricular systolic function is normal with an ejection fraction visually estimated at 55 to 60 %.   2. There is mild (grade 1) left ventricular diastolic dysfunction.   3. Normal right ventricular cavity size and normal right ventricular systolic function.   4. Normal left and right atrial size.   5. Mild to moderate mitral regurgitation.   6. Mild tricuspid regurgitation.   7. Estimated pulmonary artery systolic pressure is 24 mmHg, consistent with normal pulmonary artery pressure.   8. 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 Ascending aorta is dilated, measuring 4.25 cm (indexed 2.30 cm/m²).   9. Tricuspid aortic valve with normal leaflet excursion with normal systolic excursion. There is mild thickening of the aortic valve leaflets.  10. Mild aortic regurgitation.  11. Mild pulmonic regurgitation.    < end of copied text >  < from: CT Head No Cont (24 @ 07:57) >  CT head: Mild chronic microvascular changes without evidence of an acute   transcortical infarction or hemorrhage.    CT C-spine: Mild reversal to the normal cervical lordosis. Moderate   spondylosis. No acute osseous abnormality. Consider MRI as clinically   warranted.    --- End of Report ---            JAEL ZAFAR MD; Attending Radiologist  This document has been electronically signed. Dec  7 2024  8:47AM    < end of copied text >

## 2024-12-08 NOTE — DISCHARGE NOTE PROVIDER - NSDCMRMEDTOKEN_GEN_ALL_CORE_FT
aspirin 81 mg oral delayed release tablet: 1 tab(s) orally once a day  atorvastatin 80 mg oral tablet: 1 tab(s) orally once a day  losartan 50 mg oral tablet: 1 tab(s) orally once a day  metoprolol succinate 100 mg oral tablet, extended release: 1 tab(s) orally once a day  Multiple Vitamins oral tablet: 1 tab(s) orally once a day  thiamine 100 mg oral tablet: 1 tab(s) orally  Xarelto 15 mg oral tablet: 1 tab(s) orally once a day

## 2024-12-08 NOTE — DIETITIAN INITIAL EVALUATION ADULT - PERTINENT LABORATORY DATA
12-08    141  |  114[H]  |  18  ----------------------------<  109[H]  3.9   |  25  |  0.95    Ca    9.0      08 Dec 2024 06:08  Mg     2.2     12-07    TPro  6.8  /  Alb  3.3  /  TBili  1.0  /  DBili  x   /  AST  31  /  ALT  30  /  AlkPhos  73  12-07

## 2024-12-08 NOTE — DISCHARGE NOTE PROVIDER - CARE PROVIDER_API CALL
THEE SMITH  [address] [place] [address]  Phone: [phone]  Fax: [phone]  Follow Up Time: 7-10 Days

## 2024-12-08 NOTE — DIETITIAN INITIAL EVALUATION ADULT - ADD RECOMMEND
1) Liberalize diet to regular to maximize caloric and nutrient intake, 2) Add ensure plus high protein BID to optimize PO intake (provides 350 kcal, 20g protein/ shake), 3) Recommend to add MVI w/minerals, Vit C 500 mg BID, add Zinc Sulfate 220 mg x 10 days to promote wound healing, 4) Consider adding thiamine 100 mg daily 2/2 poor PO intake/ malnutrition, 5) Monitor bowel movements, if no BM for >3 days, consider implementing bowel regimen, 6) Consider obtaining vitamin D 25OH level to assess nutriture, 7) Consider adding appetite stimulant such as Remeron or Marinol 2/2 chronically poor appetite/ PO intake, 8) Confirm goals of care regarding nutrition support. RD will continue to monitor PO intake, labs, hydration, and wt prn.

## 2024-12-08 NOTE — CONSULT NOTE ADULT - ASSESSMENT
Syncope - no prior hx  TTE showed normal EF without any significant valvular heart disease.  orthostatic BP readings neg  recommend ambulation   outpt cardiac monitoring and ischemic HD evalv.    HTN- BP stable.  continue outpt meds    CAD s/p CABG- no anginal sx  continue outpt meds  CE neg    Hyperlipidemia- continue statin therapy  Goal LDL 55    Afib- on AC     Other medical issues- Management per primary team.   Thank you for allowing me to participate in the care of this patient. Please feel free to contact me with any questions.

## 2024-12-08 NOTE — DIETITIAN INITIAL EVALUATION ADULT - ORAL INTAKE PTA/DIET HISTORY
usual intake meeting <50-75% ENN as follows: breakfast - cereal w/ fruit (granola); lunch - skips; dinner - chicken or fish w/ veggies. Lives w/ wife, pt grocery shops and wife cooks. Fair to poor appetite

## 2024-12-08 NOTE — DISCHARGE NOTE NURSING/CASE MANAGEMENT/SOCIAL WORK - PATIENT PORTAL LINK FT
You can access the FollowMyHealth Patient Portal offered by [facility] by registering at the following website: [email or web address]. By joining [employer]’s FollowMyHealth portal, you will also be able to view your health information using other applications (apps) compatible with our system.

## 2024-12-08 NOTE — DIETITIAN INITIAL EVALUATION ADULT - PERTINENT MEDS FT
MEDICATIONS  (STANDING):  aspirin enteric coated 81 milliGRAM(s) Oral daily  atorvastatin 80 milliGRAM(s) Oral at bedtime  losartan 50 milliGRAM(s) Oral daily  metoprolol succinate  milliGRAM(s) Oral daily  multivitamin 1 Tablet(s) Oral daily  rivaroxaban 20 milliGRAM(s) Oral with dinner  sodium chloride 0.9%. 1000 milliLiter(s) (50 mL/Hr) IV Continuous <Continuous>    MEDICATIONS  (PRN):  acetaminophen     Tablet .. 650 milliGRAM(s) Oral every 6 hours PRN Mild Pain (1 - 3)  aluminum hydroxide/magnesium hydroxide/simethicone Suspension 30 milliLiter(s) Oral every 4 hours PRN Dyspepsia  melatonin 3 milliGRAM(s) Oral at bedtime PRN Insomnia  ondansetron Injectable 4 milliGRAM(s) IV Push every 8 hours PRN Nausea and/or Vomiting

## 2024-12-08 NOTE — DISCHARGE NOTE PROVIDER - NSDCCPCAREPLAN_GEN_ALL_CORE_FT
PRINCIPAL DISCHARGE DIAGNOSIS  Diagnosis: Syncope  Assessment and Plan of Treatment: You were evalauted and treated int [facility] after an episode of syncope. Could have been from dehydration, orthostasis. Vasovagal episode is possible but less likely. No signs of any acute cardiac condition such as malignant arrhythmia as telemetry monitoring has been quite unrevealing. No sign of acute coronary syndrome with serial cardiac enzymes negative and no ischemic changes on EKG. No clinical signs of decompensated congestive heart failure nor any evidence for significant heart valve disease. Echocardiogram with normal heart squeezing function. No regional wall motion abnormalities. Mild to moderate mitral valve regugitation. Normal pulmonary artery pressure. Neurologically you are fully intact. CT head and C spine negative. No sign of infection. Urinalysis negative. Respiratory viral pathogens panel negative. No sign of bleeding, normal hemoglobin. You are now doing well, ambulating steadily. No symptoms today. Stable for discharge home. Follow up with your cardiologist Dr Kasi Rollins who could consider [facility] as outpatient.      SECONDARY DISCHARGE DIAGNOSES  Diagnosis: Malnutrition  Assessment and Plan of Treatment: Supplement diet with Ensure High Protein 1 serving per day. Thiamine daily. Multivitamin with minerals daily. Monitor weight.